# Patient Record
Sex: FEMALE | Race: WHITE | NOT HISPANIC OR LATINO | Employment: UNEMPLOYED | URBAN - METROPOLITAN AREA
[De-identification: names, ages, dates, MRNs, and addresses within clinical notes are randomized per-mention and may not be internally consistent; named-entity substitution may affect disease eponyms.]

---

## 2017-03-22 ENCOUNTER — OFFICE VISIT (OUTPATIENT)
Dept: FAMILY MEDICINE | Facility: CLINIC | Age: 22
End: 2017-03-22
Payer: COMMERCIAL

## 2017-03-22 VITALS
HEART RATE: 85 BPM | DIASTOLIC BLOOD PRESSURE: 84 MMHG | HEIGHT: 66 IN | WEIGHT: 136 LBS | SYSTOLIC BLOOD PRESSURE: 119 MMHG | BODY MASS INDEX: 21.86 KG/M2

## 2017-03-22 DIAGNOSIS — B96.89 BACTERIAL VAGINAL INFECTION: ICD-10-CM

## 2017-03-22 DIAGNOSIS — Z11.3 SCREEN FOR STD (SEXUALLY TRANSMITTED DISEASE): ICD-10-CM

## 2017-03-22 DIAGNOSIS — Z01.411 ABNORMAL FEMALE PELVIC EXAM: Primary | ICD-10-CM

## 2017-03-22 DIAGNOSIS — Z30.011 ENCOUNTER FOR INITIAL PRESCRIPTION OF CONTRACEPTIVE PILLS: ICD-10-CM

## 2017-03-22 DIAGNOSIS — F17.200 TOBACCO USE DISORDER: ICD-10-CM

## 2017-03-22 DIAGNOSIS — Z87.42 HISTORY OF ABNORMAL CERVICAL PAP SMEAR: ICD-10-CM

## 2017-03-22 DIAGNOSIS — N76.0 BACTERIAL VAGINAL INFECTION: ICD-10-CM

## 2017-03-22 DIAGNOSIS — N89.8 VAGINAL DISCHARGE: ICD-10-CM

## 2017-03-22 DIAGNOSIS — A60.00 GENITAL HERPES SIMPLEX, UNSPECIFIED SITE: ICD-10-CM

## 2017-03-22 LAB
BETA HCG QUAL IFA URINE: NEGATIVE
MICRO REPORT STATUS: ABNORMAL
SPECIMEN SOURCE: ABNORMAL
WET PREP SPEC: ABNORMAL

## 2017-03-22 PROCEDURE — 87210 SMEAR WET MOUNT SALINE/INK: CPT | Performed by: NURSE PRACTITIONER

## 2017-03-22 PROCEDURE — 87591 N.GONORRHOEAE DNA AMP PROB: CPT | Performed by: NURSE PRACTITIONER

## 2017-03-22 PROCEDURE — G0124 SCREEN C/V THIN LAYER BY MD: HCPCS | Performed by: NURSE PRACTITIONER

## 2017-03-22 PROCEDURE — 84703 CHORIONIC GONADOTROPIN ASSAY: CPT | Performed by: NURSE PRACTITIONER

## 2017-03-22 PROCEDURE — 96372 THER/PROPH/DIAG INJ SC/IM: CPT | Performed by: NURSE PRACTITIONER

## 2017-03-22 PROCEDURE — 87491 CHLMYD TRACH DNA AMP PROBE: CPT | Performed by: NURSE PRACTITIONER

## 2017-03-22 PROCEDURE — 99214 OFFICE O/P EST MOD 30 MIN: CPT | Mod: 25 | Performed by: NURSE PRACTITIONER

## 2017-03-22 PROCEDURE — 88175 CYTOPATH C/V AUTO FLUID REDO: CPT | Performed by: NURSE PRACTITIONER

## 2017-03-22 RX ORDER — METRONIDAZOLE 500 MG/1
500 TABLET ORAL 2 TIMES DAILY
Qty: 14 TABLET | Refills: 0 | Status: SHIPPED | OUTPATIENT
Start: 2017-03-22 | End: 2017-04-20

## 2017-03-22 RX ORDER — VALACYCLOVIR HYDROCHLORIDE 500 MG/1
500 TABLET, FILM COATED ORAL 2 TIMES DAILY
Qty: 6 TABLET | Refills: 3 | Status: SHIPPED | OUTPATIENT
Start: 2017-03-22 | End: 2017-03-22

## 2017-03-22 NOTE — NURSING NOTE
"Initial /84 (BP Location: Left arm, Patient Position: Chair, Cuff Size: Adult Regular)  Pulse 85  Ht 5' 6.25\" (1.683 m)  Wt 136 lb (61.7 kg)  BMI 21.79 kg/m2 Estimated body mass index is 21.79 kg/(m^2) as calculated from the following:    Height as of this encounter: 5' 6.25\" (1.683 m).    Weight as of this encounter: 136 lb (61.7 kg). .    Bina Sampson    "

## 2017-03-22 NOTE — PROGRESS NOTES
SUBJECTIVE:                                                    Daniela Byrne is a 21 year old female who presents to clinic today for the following health issues:      Medication Followup of Valtrex    Taking Medication as prescribed: has been out of meds. Currently having an outbreak which started 4-5 days ago. Has not had for 2 years    Side Effects:  None    Medication Helping Symptoms:  yes       Pap, patient is due for a repeat pap. HX of LSIL. S/P colposcopy 11/5/15- did not do follow up .     Has concerns about herpes outbreak- thinks she has an outbreak.     Problem list and histories reviewed & adjusted, as indicated.  Additional history: as documented    Patient Active Problem List   Diagnosis     Health Care Home     HSV-1 infection     Abnormal Pap smear of cervix     Genital herpes     CARDIOVASCULAR SCREENING; LDL GOAL LESS THAN 160     Mild persistent asthma, uncomplicated     Encounter for surveillance of injectable contraceptive     Past Surgical History:   Procedure Laterality Date     NO HISTORY OF SURGERY  8/2011       Social History   Substance Use Topics     Smoking status: Current Every Day Smoker     Packs/day: 0.50     Years: 8.00     Types: Cigarettes     Smokeless tobacco: Never Used     Alcohol use 4.2 oz/week     7 Standard drinks or equivalent per week      Comment: Completed STOP and DUAL alcohol tx programs.      Family History   Problem Relation Age of Onset     Alcohol/Drug Father      ETOH     Depression Father      CANCER Maternal Grandmother      larynx           Reviewed and updated as needed this visit by clinical staff       Reviewed and updated as needed this visit by Provider         ROS:  Constitutional, HEENT, cardiovascular, pulmonary, GI, , musculoskeletal, neuro, skin, endocrine and psych systems are negative, except as otherwise noted.    OBJECTIVE:                                                    /84 (BP Location: Left arm, Patient Position: Chair, Cuff  "Size: Adult Regular)  Pulse 85  Ht 5' 6.25\" (1.683 m)  Wt 136 lb (61.7 kg)  LMP 02/22/2017  BMI 21.79 kg/m2  Body mass index is 21.79 kg/(m^2).  GENERAL: healthy, alert and no distress  HENT: ear canals and TM's normal, nose and mouth without ulcers or lesions  NECK: no adenopathy, no asymmetry, masses, or scars and thyroid normal to palpation  RESP: lungs clear to auscultation - no rales, rhonchi or wheezes  CV: regular rate and rhythm, normal S1 S2, no S3 or S4, no murmur, click or rub, no peripheral edema and peripheral pulses strong   (female): normal female external genitalia, normal urethral meatus , vaginal mucosa pink, moist, well rugated, vaginal discharge - moderate and white and normal cervix, adnexae, and uterus without masses.  RECTAL (female): anal fissure   MS: no gross musculoskeletal defects noted, no edema    Diagnostic Test Results:  none      ASSESSMENT/PLAN:                                                      1. Abnormal female pelvic exam  ? Bacterial vs yeast infection      3. Contraception    - Beta HCG qual IFA urine  - Medroxyprogesterone inj  1mg   (Depo Provera J-Code)  - INJECTION INTRAMUSCULAR OR SUB-Q    4. Screen for STD (sexually transmitted disease)    - Chlamydia trachomatis PCR  - Neisseria gonorrhoeae PCR    6. Abnormal Pap smear of cervix  Patient had colposcopy in 2015 and failed to follow up with repeat PAP  - Pap imaged thin layer screen only - recommended age 21 - 24 years    7. Vaginal discharge  R/u yeast infection vs bacterial infection- pending results may need to send in prescription   - Wet prep    8. Tobacco use disorder  Encouraged patient to quit smoking- she is not interested at this time      Addendum: Wet prep positive for BV- will send in Flagyl 500 mg bid X 7 days-          NIA Alcaraz [facility]  "

## 2017-03-22 NOTE — PATIENT INSTRUCTIONS
Thank you for choosing [facility].  You may be receiving a survey in the mail from Hemalatha Bautista regarding your visit today.  Please take a few minutes to complete and return the survey to let us know how we are doing.      If you have questions or concerns, please contact us via [email or web address] or you can contact your care team at [phone].    Our Clinic hours are:  Monday 6:40 am  to 7:00 pm  Tuesday -Friday 6:40 am to 5:00 pm    The [place] outpatient lab hours are:  Monday - Friday 6:10 am to 4:45 pm  Saturdays 7:00 am to 11:00 am  Appointments are required, call [phone]    If you have clinical questions after hours or would like to schedule an appointment,  call the clinic at [phone].

## 2017-03-22 NOTE — MR AVS SNAPSHOT
After Visit Summary   3/22/2017    Daniela Byrne    MRN: [id number]           Patient Information     Date Of Birth          1995        Visit Information        Provider Department      3/22/2017 11:20 AM Bisi Muro APRN CNP [facility]        Today's Diagnoses     Contraception    -  1    Abnormal female pelvic exam        Screen for STD (sexually transmitted disease)        Abnormal Pap smear of cervix        Vaginal discharge        Tobacco use disorder        Bacterial vaginal infection          Care Instructions          Thank you for choosing [facility].  You may be receiving a survey in the mail from Hemalatha Bautista regarding your visit today.  Please take a few minutes to complete and return the survey to let us know how we are doing.      If you have questions or concerns, please contact us via [address] or you can contact your care team at [phone].    Our Clinic hours are:  Monday 6:40 am  to 7:00 pm  Tuesday -Friday 6:40 am to 5:00 pm    The [place] outpatient lab hours are:  Monday - Friday 6:10 am to 4:45 pm  Saturdays 7:00 am to 11:00 am  Appointments are required, call [phone]    If you have clinical questions after hours or would like to schedule an appointment,  call the clinic at [phone].        Follow-ups after your visit        Who to contact     If you have questions or need follow up information about today's clinic visit or your schedule please contact [facility] directly at [phone].  Normal or non-critical lab and imaging results will be communicated to you by MyChart, letter or phone within 4 business days after the clinic has received the results. If you do not hear from us within 7 days, please contact the clinic through [employer] or phone. If you have a critical or abnormal lab result, we will notify you by phone as soon as possible.  Submit refill requests through [address] or call your pharmacy and they will  "forward the refill request to us. Please allow 3 business days for your refill to be completed.          Additional Information About Your Visit        [employer] Information     [employer] gives you secure access to your electronic health record. If you see a primary care provider, you can also send messages to your care team and make appointments. If you have questions, please call your primary care clinic.  If you do not have a primary care provider, please call [phone] and they will assist you.        Care EveryWhere ID     This is your Care EveryWhere ID. This could be used by other organizations to access your [place] medical records  [id number]        Your Vitals Were     Pulse Height Last Period BMI (Body Mass Index)          85 5' 6.25\" (1.683 m) 02/22/2017 21.79 kg/m2         Blood Pressure from Last 3 Encounters:   03/22/17 119/84   01/08/16 111/76   01/05/16 109/86    Weight from Last 3 Encounters:   03/22/17 136 lb (61.7 kg)   01/08/16 136 lb (61.7 kg)   01/05/16 136 lb 3.2 oz (61.8 kg)              We Performed the Following     Beta HCG qual IFA urine     Chlamydia trachomatis PCR     INJECTION INTRAMUSCULAR OR SUB-Q     Medroxyprogesterone inj  1mg   (Depo Provera J-Code)     Neisseria gonorrhoeae PCR     Pap imaged thin layer screen only - recommended age 21 - 24 years     Wet prep          Today's Medication Changes          These changes are accurate as of: 3/22/17 12:36 PM.  If you have any questions, ask your nurse or doctor.               Start taking these medicines.        Dose/Directions    metroNIDAZOLE 500 MG tablet   Commonly known as:  FLAGYL   Used for:  Bacterial vaginal infection   Started by:  Bisi Muro APRN CNP        Dose:  500 mg   Take 1 tablet (500 mg) by mouth 2 times daily   Quantity:  14 tablet   Refills:  0         Stop taking these medicines if you haven't already. Please contact your care team if you have questions.     valACYclovir 500 MG tablet   Commonly " known as:  VALTREX   Stopped by:  Bisi Muro APRN CNP                Where to get your medicines      These medications were sent to [facility] [place] - [address]  [address] [facility] [address] [facility] [address]     Phone:  [phone]     metroNIDAZOLE 500 MG tablet                Primary Care Provider Office Phone # Fax #    Larisa EspinalJORDAN [phone]       [facility] [address] [address]        Thank you!     Thank you for choosing [facility]  for your care. Our goal is always to provide you with excellent care. Hearing back from our patients is one way we can continue to improve our services. Please take a few minutes to complete the written survey that you may receive in the mail after your visit with us. Thank you!             Your Updated Medication List - Protect others around you: Learn how to safely use, store and throw away your medicines at www.disposemymeds.org.          This list is accurate as of: 3/22/17 12:36 PM.  Always use your most recent med list.                   Brand Name Dispense Instructions for use    * albuterol (2.5 MG/3ML) 0.083% neb solution     60 mL    Take 1 vial (2.5 mg) by nebulization every 4 hours as needed for shortness of breath / dyspnea       * albuterol 108 (90 BASE) MCG/ACT Inhaler    PROAIR HFA/PROVENTIL HFA/VENTOLIN HFA    1 Inhaler    Inhale 2 puffs into the lungs every 4 hours as needed for shortness of breath / dyspnea       budesonide 180 MCG/ACT inhaler    PULMICORT FLEXHALER    1 Inhaler    Inhale 1 puff into the lungs 2 times daily       DEPO-PROVERA 150 MG/ML injection   Generic drug:  medroxyPROGESTERone      Inject 150 mg into the muscle every 3 months Reported on 3/22/2017       metroNIDAZOLE 500 MG tablet    FLAGYL    14 tablet    Take 1 tablet (500 mg) by mouth 2 times daily       * Notice:  This list has 2 medication(s) that are the same as other medications  prescribed for you. Read the directions carefully, and ask your doctor or other care provider to review them with you.

## 2017-03-23 LAB
C TRACH DNA SPEC QL NAA+PROBE: NORMAL
N GONORRHOEA DNA SPEC QL NAA+PROBE: NORMAL
SPECIMEN SOURCE: NORMAL
SPECIMEN SOURCE: NORMAL

## 2017-03-23 ASSESSMENT — ASTHMA QUESTIONNAIRES: ACT_TOTALSCORE: 21

## 2017-03-29 LAB
COPATH REPORT: ABNORMAL
PAP: ABNORMAL

## 2017-04-20 ENCOUNTER — OFFICE VISIT (OUTPATIENT)
Dept: OBGYN | Facility: CLINIC | Age: 22
End: 2017-04-20
Payer: COMMERCIAL

## 2017-04-20 VITALS
HEIGHT: 65 IN | WEIGHT: 136 LBS | HEART RATE: 60 BPM | BODY MASS INDEX: 22.66 KG/M2 | SYSTOLIC BLOOD PRESSURE: 104 MMHG | DIASTOLIC BLOOD PRESSURE: 67 MMHG

## 2017-04-20 DIAGNOSIS — Z01.812 PRE-PROCEDURE LAB EXAM: ICD-10-CM

## 2017-04-20 DIAGNOSIS — R87.611 ATYPICAL SQUAMOUS CELLS CANNOT EXCLUDE HIGH GRADE SQUAMOUS INTRAEPITHELIAL LESION ON CYTOLOGIC SMEAR OF CERVIX (ASC-H): Primary | ICD-10-CM

## 2017-04-20 LAB — BETA HCG QUAL IFA URINE: NEGATIVE

## 2017-04-20 PROCEDURE — 88342 IMHCHEM/IMCYTCHM 1ST ANTB: CPT | Performed by: OBSTETRICS & GYNECOLOGY

## 2017-04-20 PROCEDURE — 57456 ENDOCERV CURETTAGE W/SCOPE: CPT | Performed by: OBSTETRICS & GYNECOLOGY

## 2017-04-20 PROCEDURE — 88305 TISSUE EXAM BY PATHOLOGIST: CPT | Performed by: OBSTETRICS & GYNECOLOGY

## 2017-04-20 PROCEDURE — 84703 CHORIONIC GONADOTROPIN ASSAY: CPT | Performed by: OBSTETRICS & GYNECOLOGY

## 2017-04-20 PROCEDURE — 88341 IMHCHEM/IMCYTCHM EA ADD ANTB: CPT | Performed by: OBSTETRICS & GYNECOLOGY

## 2017-04-20 NOTE — MR AVS SNAPSHOT
"              After Visit Summary   4/20/2017    Daniela Byrne    MRN: [id number]           Patient Information     Date Of Birth          1995        Visit Information        Provider Department      4/20/2017 10:30 AM Tracie Steel MD; [facility]        Today's Diagnoses     Atypical squamous cells cannot exclude high grade squamous intraepithelial lesion on cytologic smear of cervix (ASC-H)    -  1    Pre-procedure lab exam           Follow-ups after your visit        Who to contact     If you have questions or need follow up information about today's clinic visit or your schedule please contact [facility] directly at [phone].  Normal or non-critical lab and imaging results will be communicated to you by MyChart, letter or phone within 4 business days after the clinic has received the results. If you do not hear from us within 7 days, please contact the clinic through [email or web address] or phone. If you have a critical or abnormal lab result, we will notify you by phone as soon as possible.  Submit refill requests through [employer] or call your pharmacy and they will forward the refill request to us. Please allow 3 business days for your refill to be completed.          Additional Information About Your Visit        MyChart Information     [employer] gives you secure access to your electronic health record. If you see a primary care provider, you can also send messages to your care team and make appointments. If you have questions, please call your primary care clinic.  If you do not have a primary care provider, please call [phone] and they will assist you.        Care EveryWhere ID     This is your Care EveryWhere ID. This could be used by other organizations to access your [place] medical records  [id number]        Your Vitals Were     Pulse Height Last Period Breastfeeding? BMI (Body Mass Index)       60 5' 5\" (1.651 m) 02/22/2017 No 22.63 kg/m2  "       Blood Pressure from Last 3 Encounters:   04/20/17 104/67   03/22/17 119/84   01/08/16 111/76    Weight from Last 3 Encounters:   04/20/17 136 lb (61.7 kg)   03/22/17 136 lb (61.7 kg)   01/08/16 136 lb (61.7 kg)              We Performed the Following     Beta HCG qual IFA urine     COLP CERVIX/UPPER VAGINA W ENDOCERV CURETT     Surgical pathology exam        Primary Care Provider Office Phone # Fax #    Larisa Malissa Espinal [phone]       [facility] [address] [address]        Thank you!     Thank you for choosing [facility]  for your care. Our goal is always to provide you with excellent care. Hearing back from our patients is one way we can continue to improve our services. Please take a few minutes to complete the written survey that you may receive in the mail after your visit with us. Thank you!             Your Updated Medication List - Protect others around you: Learn how to safely use, store and throw away your medicines at www.disposemymeds.org.          This list is accurate as of: 4/20/17 11:54 AM.  Always use your most recent med list.                   Brand Name Dispense Instructions for use    * albuterol (2.5 MG/3ML) 0.083% neb solution     60 mL    Take 1 vial (2.5 mg) by nebulization every 4 hours as needed for shortness of breath / dyspnea       * albuterol 108 (90 BASE) MCG/ACT Inhaler    PROAIR HFA/PROVENTIL HFA/VENTOLIN HFA    1 Inhaler    Inhale 2 puffs into the lungs every 4 hours as needed for shortness of breath / dyspnea       budesonide 180 MCG/ACT inhaler    PULMICORT FLEXHALER    1 Inhaler    Inhale 1 puff into the lungs 2 times daily       DEPO-PROVERA 150 MG/ML injection   Generic drug:  medroxyPROGESTERone      Inject 150 mg into the muscle every 3 months Reported on 3/22/2017       * Notice:  This list has 2 medication(s) that are the same as other medications prescribed for you. Read the directions carefully, and  ask your doctor or other care provider to review them with you.

## 2017-04-20 NOTE — PROGRESS NOTES
Daniela Byrne is a 21 year old female G0 who presents for repeat colposcopy. Referred by NIA Alcaraz CNP    Pap smear hx is as follows:   9/28/12: Pap - ASCUS. Repeat pap in 1 year. (< 21 yrs of age).  7/31/14: Pap - ASCUS, + HR HPV 16. Repeat pap in 1 year. (19 yrs old).   10/6/15 pap LSIL/+ HR HPV 16 and other HR HPV. Plan: colp  11/5/15: [place] Bx - MAHAMED 2 & 3, ECC - HSIL. Plan pap and colp in 6 months.   Pt failed follow-up.   3/22/17: ASC-H Pap. Plan colp    Patient's last menstrual period was 02/22/2017.   UPT today is negative      Patient does smoke    Type of contraception: Depo-Provera  Age at first sexual intercourse: 12-13  Number of sexual partners (lifetime): 30-40  Past GYN history:  No STD history  Prior cervical/vaginal disease: MAHAMED 2-3.  Prior cervical treatment: no treatment.      PROCEDURE:  Before the procedure, it was ensured that the patient was educated regarding the nature of her findings to date, the implications, and what was to be done. She has been made aware of the role of HPV, the natural history of infection, ways to minimize her future risk, the effect of HPV on the cervix, and treatment options available should they be indicated.  The details of the colposcopic procedure were reviewed.  All questions were answered before proceeding, and informed consent was therefore obtained.    Speculum placed in vagina and excellent visualization of cervix   acheived, cervix swabbed x 3 with acetic acid solution.    FINDINGS:  Cervix: no visible lesions  Please refer to images section for details.    Pap repeated?:  No  SCJ seen?:  yes    ECC done?:  Yes  Lugol's solution used?:  Yes, with no lesions seen  Satisfactory examination?:  yes      ASSESSMENT: Normal exam without visible pathology.    PLAN: specimens labelled and sent to Pathology, will base further treatment on Pathology findings, post biopsy instructions given to patient, call to discuss Pathology results and  Smoking  cessation discussed    Tracie Steel MD  [facility]

## 2017-04-20 NOTE — NURSING NOTE
"Initial /67 (BP Location: Left arm, Patient Position: Chair, Cuff Size: Adult Regular)  Pulse 60  Ht 5' 5\" (1.651 m)  Wt 136 lb (61.7 kg)  LMP 02/22/2017  Breastfeeding? No  BMI 22.63 kg/m2 Estimated body mass index is 22.63 kg/(m^2) as calculated from the following:    Height as of this encounter: 5' 5\" (1.651 m).    Weight as of this encounter: 136 lb (61.7 kg). .    Rachel Luther    "

## 2017-04-23 LAB — COPATH REPORT: NORMAL

## 2017-04-24 NOTE — PROGRESS NOTES
Given that MAHAMED 3 has persisted for more than a year as she has had this in 2015, I would recommend that she undergo a LEEP instead of colposcopy and cytology in 6 months.     Tracie Steel MD  [facility]

## 2017-09-17 ENCOUNTER — APPOINTMENT (OUTPATIENT)
Dept: GENERAL RADIOLOGY | Facility: CLINIC | Age: 22
DRG: 101 | End: 2017-09-17
Attending: FAMILY MEDICINE
Payer: COMMERCIAL

## 2017-09-17 ENCOUNTER — APPOINTMENT (OUTPATIENT)
Dept: CT IMAGING | Facility: CLINIC | Age: 22
DRG: 101 | End: 2017-09-17
Attending: FAMILY MEDICINE
Payer: COMMERCIAL

## 2017-09-17 ENCOUNTER — HOSPITAL ENCOUNTER (INPATIENT)
Facility: CLINIC | Age: 22
LOS: 6 days | Discharge: HOME | DRG: 101 | End: 2017-09-24
Attending: FAMILY MEDICINE | Admitting: ORTHOPAEDIC SURGERY
Payer: COMMERCIAL

## 2017-09-17 DIAGNOSIS — R56.9 SEIZURES (H): ICD-10-CM

## 2017-09-17 DIAGNOSIS — F15.10 METHAMPHETAMINE ABUSE (H): ICD-10-CM

## 2017-09-17 DIAGNOSIS — T50.902A PURPOSEFUL NON-SUICIDAL DRUG INGESTION, INITIAL ENCOUNTER (H): ICD-10-CM

## 2017-09-17 DIAGNOSIS — F16.90: ICD-10-CM

## 2017-09-17 LAB
ALBUMIN SERPL-MCNC: 4.6 G/DL (ref 3.4–5)
ALBUMIN UR-MCNC: 100 MG/DL
ALP SERPL-CCNC: 82 U/L (ref 40–150)
ALT SERPL W P-5'-P-CCNC: 46 U/L (ref 0–50)
AMPHETAMINES UR QL SCN: POSITIVE
ANION GAP SERPL CALCULATED.3IONS-SCNC: 15 MMOL/L (ref 3–14)
APAP SERPL-MCNC: <2 MG/L (ref 10–20)
APPEARANCE UR: CLEAR
AST SERPL W P-5'-P-CCNC: 44 U/L (ref 0–45)
BARBITURATES UR QL: NEGATIVE
BASOPHILS # BLD AUTO: 0 10E9/L (ref 0–0.2)
BASOPHILS NFR BLD AUTO: 0.1 %
BENZODIAZ UR QL: NEGATIVE
BILIRUB SERPL-MCNC: 0.6 MG/DL (ref 0.2–1.3)
BILIRUB UR QL STRIP: NEGATIVE
BUN SERPL-MCNC: 5 MG/DL (ref 7–30)
CALCIUM SERPL-MCNC: 9 MG/DL (ref 8.5–10.1)
CANNABINOIDS UR QL SCN: NEGATIVE
CHLORIDE SERPL-SCNC: 105 MMOL/L (ref 94–109)
CO2 SERPL-SCNC: 18 MMOL/L (ref 20–32)
COCAINE UR QL: NEGATIVE
COLOR UR AUTO: YELLOW
CREAT SERPL-MCNC: 0.6 MG/DL (ref 0.52–1.04)
DIFFERENTIAL METHOD BLD: ABNORMAL
EOSINOPHIL # BLD AUTO: 0 10E9/L (ref 0–0.7)
EOSINOPHIL NFR BLD AUTO: 0 %
ERYTHROCYTE [DISTWIDTH] IN BLOOD BY AUTOMATED COUNT: 13.8 % (ref 10–15)
ETHANOL SERPL-MCNC: 0.02 G/DL
ETHANOL UR QL SCN: POSITIVE
GFR SERPL CREATININE-BSD FRML MDRD: >90 ML/MIN/1.7M2
GLUCOSE SERPL-MCNC: 107 MG/DL (ref 70–99)
GLUCOSE UR STRIP-MCNC: NEGATIVE MG/DL
HCG UR QL: NEGATIVE
HCT VFR BLD AUTO: 37.4 % (ref 35–47)
HGB BLD-MCNC: 13.5 G/DL (ref 11.7–15.7)
HGB UR QL STRIP: ABNORMAL
IMM GRANULOCYTES # BLD: 0 10E9/L (ref 0–0.4)
IMM GRANULOCYTES NFR BLD: 0.3 %
INTERNAL QC OK POCT: YES
KETONES UR STRIP-MCNC: 10 MG/DL
LACTATE BLD-SCNC: 4.5 MMOL/L (ref 0.7–2)
LEUKOCYTE ESTERASE UR QL STRIP: NEGATIVE
LIPASE SERPL-CCNC: 92 U/L (ref 73–393)
LYMPHOCYTES # BLD AUTO: 0.6 10E9/L (ref 0.8–5.3)
LYMPHOCYTES NFR BLD AUTO: 5.2 %
MCH RBC QN AUTO: 35.3 PG (ref 26.5–33)
MCHC RBC AUTO-ENTMCNC: 36.1 G/DL (ref 31.5–36.5)
MCV RBC AUTO: 98 FL (ref 78–100)
MONOCYTES # BLD AUTO: 0.5 10E9/L (ref 0–1.3)
MONOCYTES NFR BLD AUTO: 4.1 %
MUCOUS THREADS #/AREA URNS LPF: PRESENT /LPF
NEUTROPHILS # BLD AUTO: 10.9 10E9/L (ref 1.6–8.3)
NEUTROPHILS NFR BLD AUTO: 90.3 %
NITRATE UR QL: NEGATIVE
NRBC # BLD AUTO: 0 10*3/UL
NRBC BLD AUTO-RTO: 0 /100
OPIATES UR QL SCN: NEGATIVE
PH UR STRIP: 6 PH (ref 5–7)
PLATELET # BLD AUTO: 171 10E9/L (ref 150–450)
POTASSIUM SERPL-SCNC: 3.9 MMOL/L (ref 3.4–5.3)
PROT SERPL-MCNC: 9 G/DL (ref 6.8–8.8)
RBC # BLD AUTO: 3.82 10E12/L (ref 3.8–5.2)
RBC #/AREA URNS AUTO: 2 /HPF (ref 0–2)
SALICYLATES SERPL-MCNC: 4 MG/DL
SODIUM SERPL-SCNC: 138 MMOL/L (ref 133–144)
SOURCE: ABNORMAL
SP GR UR STRIP: 1.02 (ref 1–1.03)
SQUAMOUS #/AREA URNS AUTO: <1 /HPF (ref 0–1)
TSH SERPL DL<=0.005 MIU/L-ACNC: 0.53 MU/L (ref 0.4–4)
UROBILINOGEN UR STRIP-MCNC: NORMAL MG/DL (ref 0–2)
WBC # BLD AUTO: 12 10E9/L (ref 4–11)
WBC #/AREA URNS AUTO: 1 /HPF (ref 0–2)

## 2017-09-17 PROCEDURE — 80307 DRUG TEST PRSMV CHEM ANLYZR: CPT | Performed by: FAMILY MEDICINE

## 2017-09-17 PROCEDURE — 81001 URINALYSIS AUTO W/SCOPE: CPT | Performed by: FAMILY MEDICINE

## 2017-09-17 PROCEDURE — 99285 EMERGENCY DEPT VISIT HI MDM: CPT | Mod: 25 | Performed by: FAMILY MEDICINE

## 2017-09-17 PROCEDURE — 80320 DRUG SCREEN QUANTALCOHOLS: CPT | Performed by: FAMILY MEDICINE

## 2017-09-17 PROCEDURE — 93010 ELECTROCARDIOGRAM REPORT: CPT | Mod: Z6 | Performed by: FAMILY MEDICINE

## 2017-09-17 PROCEDURE — 83605 ASSAY OF LACTIC ACID: CPT | Performed by: FAMILY MEDICINE

## 2017-09-17 PROCEDURE — HZ2ZZZZ DETOXIFICATION SERVICES FOR SUBSTANCE ABUSE TREATMENT: ICD-10-PCS | Performed by: FAMILY MEDICINE

## 2017-09-17 PROCEDURE — 80329 ANALGESICS NON-OPIOID 1 OR 2: CPT | Performed by: FAMILY MEDICINE

## 2017-09-17 PROCEDURE — 83690 ASSAY OF LIPASE: CPT | Performed by: FAMILY MEDICINE

## 2017-09-17 PROCEDURE — 71020 XR CHEST 2 VW: CPT

## 2017-09-17 PROCEDURE — 93005 ELECTROCARDIOGRAM TRACING: CPT | Performed by: FAMILY MEDICINE

## 2017-09-17 PROCEDURE — 81025 URINE PREGNANCY TEST: CPT | Performed by: FAMILY MEDICINE

## 2017-09-17 PROCEDURE — 80053 COMPREHEN METABOLIC PANEL: CPT | Performed by: FAMILY MEDICINE

## 2017-09-17 PROCEDURE — 96374 THER/PROPH/DIAG INJ IV PUSH: CPT | Performed by: FAMILY MEDICINE

## 2017-09-17 PROCEDURE — 85025 COMPLETE CBC W/AUTO DIFF WBC: CPT | Performed by: FAMILY MEDICINE

## 2017-09-17 PROCEDURE — 70450 CT HEAD/BRAIN W/O DYE: CPT

## 2017-09-17 PROCEDURE — 96361 HYDRATE IV INFUSION ADD-ON: CPT | Performed by: FAMILY MEDICINE

## 2017-09-17 PROCEDURE — 25000128 H RX IP 250 OP 636: Performed by: FAMILY MEDICINE

## 2017-09-17 PROCEDURE — 82550 ASSAY OF CK (CPK): CPT | Performed by: FAMILY MEDICINE

## 2017-09-17 PROCEDURE — 84443 ASSAY THYROID STIM HORMONE: CPT | Performed by: FAMILY MEDICINE

## 2017-09-17 RX ORDER — LORAZEPAM 2 MG/ML
0.5 INJECTION INTRAMUSCULAR ONCE
Status: COMPLETED | OUTPATIENT
Start: 2017-09-17 | End: 2017-09-17

## 2017-09-17 RX ADMIN — LORAZEPAM 0.5 MG: 2 INJECTION INTRAMUSCULAR; INTRAVENOUS at 21:54

## 2017-09-17 RX ADMIN — SODIUM CHLORIDE 1000 ML: 9 INJECTION, SOLUTION INTRAVENOUS at 21:52

## 2017-09-17 RX ADMIN — SODIUM CHLORIDE 1000 ML: 9 INJECTION, SOLUTION INTRAVENOUS at 23:43

## 2017-09-17 NOTE — IP AVS SNAPSHOT
[address] [address]    Phone:  [phone]                                       After Visit Summary   9/17/2017    Daniela Byrne    MRN: [id number]           After Visit Summary Signature Page     I have received my discharge instructions, and my questions have been answered. I have discussed any challenges I see with this plan with the nurse or doctor.    ..........................................................................................................................................  Patient/Patient Representative Signature      ..........................................................................................................................................  Patient Representative Print Name and Relationship to Patient    ..................................................               ................................................  Date                                            Time    ..........................................................................................................................................  Reviewed by Signature/Title    ...................................................              ..............................................  Date                                                            Time

## 2017-09-17 NOTE — IP AVS SNAPSHOT
MRN:[id number]                      After Visit Summary   9/17/2017    Daniela Byrne    MRN: [id number]           Thank you!     Thank you for choosing [facility] for your care. Our goal is always to provide you with excellent care. Hearing back from our patients is one way we can continue to improve our services. Please take a few minutes to complete the written survey that you may receive in the mail after you visit with us. Thank you!        Patient Information     Date Of Birth          1995        Designated Caregiver       Most Recent Value    Caregiver    Will someone help with your care after discharge? yes    Name of designated caregiver Kirstin Byrne    Phone number of caregiver [phone]    Caregiver address        About your hospital stay     You were admitted on:  September 18, 2017 You last received care in the:  [address] [facility]    You were discharged on:  September 24, 2017        Reason for your hospital stay       You were admitted for a seizure related to drug intake/alcohol withdrawal.  You were placed on alcohol withdrawal protocol, had no further seizures, and were discharged.  You did not need to see neurology or start anti-seizure medications.            Reason for your hospital stay       You were admitted after a seizure and treated for muscle breakdown (rhabdomyolysis) with lots of IV fluids. Your kidney numbers remained normal.                  Who to Call     For medical emergencies, please call 911.  For non-urgent questions about your medical care, please call your primary care provider or clinic, [phone]          Attending Provider     Provider Specialty    Brain Lopez MD Emergency Medicine    Jeff Molina MD Internal Medicine    Tal, MD Shr[facility] Internal Medicine       Primary Care Provider Office Phone # Fax #    Larisa Espinal [phone]      After Care Instructions     Activity       Your activity upon  discharge: activity as tolerated            Activity       Your activity upon discharge: activity as tolerated            Diet       Follow this diet upon discharge: Orders Placed This Encounter      Regular Diet Adult            Diet       Follow this diet upon discharge: Orders Placed This Encounter      Room Service      Regular Diet Adult      Diet            Discharge Instructions       Please get over the counter vitamins: Vitamin B1 (thiamine) and folic acid. Alternatively can take a multivitamin with these in it.                  Follow-up Appointments     Adult [facility] Follow-up and recommended labs and tests       Follow up with primary care provider, Larisa Espinal, within 7 days for hospital follow- up.  The following labs/tests are recommended: CK.      Appointments on [place] and/or [facility] (with [facility] or [facility] provider or service). Call [phone] if you haven't heard regarding these appointments within 7 days of discharge.                  Your next 10 appointments already scheduled     Oct 10, 2017  2:30 PM CDT   Office Visit with Gissell Le MD, [facility] 2   [facility] ([facility])    [address] [address] [phone]           Bring a current list of meds and any records pertaining to this visit. For Physicals, please bring immunization records and any forms needing to be filled out. Please arrive 10 minutes early to complete paperwork.              Pending Results     No orders found from 9/15/2017 to 9/18/2017.            Statement of Approval     Ordered          09/24/17 1046  I have reviewed and agree with all the recommendations and orders detailed in this document.  EFFECTIVE NOW     Approved and electronically signed by:  Brenda Geiger MD             Admission Information     Date & Time Provider Department Dept. Phone    9/17/2017 Shruthi Parada MD [address] [facility] [place] [phone]      Your Vitals  Were     Blood Pressure Pulse Temperature Respirations Weight Pulse Oximetry    124/84 (BP Location: Left arm) 71 97  F (36.1  C) (Oral) 16 61 kg (134 lb 6.4 oz) 100%    BMI (Body Mass Index)                   22.37 kg/m2           [facility] Information     [facility] gives you secure access to your electronic health record. If you see a primary care provider, you can also send messages to your care team and make appointments. If you have questions, please call your primary care clinic.  If you do not have a primary care provider, please call [phone] and they will assist you.        Care EveryWhere ID     This is your Care EveryWhere ID. This could be used by other organizations to access your [facility] medical records  [id number]        Equal Access to Services     RAÚL FUENTES : Anne Nelson, frederick burdick, samuel pappas, morgan miguel. So [facility] [phone].    ATENCIÓN: Si habla español, tiene a cook disposición servicios gratuitos de asistencia lingüística. Llame al [phone].    We comply with applicable federal civil rights laws and [place] laws. We do not discriminate on the basis of race, color, national origin, age, disability sex, sexual orientation or gender identity.               Review of your medicines      CONTINUE these medicines which have NOT CHANGED        Dose / Directions    albuterol 108 (90 BASE) MCG/ACT Inhaler   Commonly known as:  PROAIR HFA/PROVENTIL HFA/VENTOLIN HFA   Used for:  Mild persistent asthma, uncomplicated        Dose:  2 puff   Inhale 2 puffs into the lungs every 4 hours as needed for shortness of breath / dyspnea   Quantity:  1 Inhaler   Refills:  5       DEPO-PROVERA 150 MG/ML injection   Used for:  Low grade squamous intraepithelial lesion on cytologic smear of cervix (LGSIL)   Generic drug:  medroxyPROGESTERone        Dose:  150 mg   Inject 150 mg into the muscle every 3 months Reported on 3/22/2017   Refills:   0                Protect others around you: Learn how to safely use, store and throw away your medicines at www.disposemymeds.org.             Medication List: This is a list of all your medications and when to take them. Check marks below indicate your daily home schedule. Keep this list as a reference.      Medications           Morning Afternoon Evening Bedtime As Needed    albuterol 108 (90 BASE) MCG/ACT Inhaler   Commonly known as:  PROAIR HFA/PROVENTIL HFA/VENTOLIN HFA   Inhale 2 puffs into the lungs every 4 hours as needed for shortness of breath / dyspnea   Last time this was given:  2 puffs on 9/19/2017  8:50 PM                                DEPO-PROVERA 150 MG/ML injection   Inject 150 mg into the muscle every 3 months Reported on 3/22/2017   Generic drug:  medroxyPROGESTERone

## 2017-09-18 PROBLEM — R56.9 SEIZURE (H): Status: ACTIVE | Noted: 2017-09-18

## 2017-09-18 LAB
ACETAMINOPHEN QUAL: NEGATIVE
ALBUMIN SERPL-MCNC: 4 G/DL (ref 3.4–5)
ALP SERPL-CCNC: 66 U/L (ref 40–150)
ALT SERPL W P-5'-P-CCNC: 42 U/L (ref 0–50)
AMOBARBITAL QUAL: NEGATIVE
ANION GAP SERPL CALCULATED.3IONS-SCNC: 14 MMOL/L (ref 3–14)
AST SERPL W P-5'-P-CCNC: 87 U/L (ref 0–45)
BARBITAL QUAL: NEGATIVE
BASOPHILS # BLD AUTO: 0 10E9/L (ref 0–0.2)
BASOPHILS NFR BLD AUTO: 0.2 %
BILIRUB SERPL-MCNC: 1.2 MG/DL (ref 0.2–1.3)
BUN SERPL-MCNC: 5 MG/DL (ref 7–30)
BUTABARBITAL QUAL: NEGATIVE
BUTALBITAL QUAL: NEGATIVE
CAFFEINE QUAL: POSITIVE
CALCIUM SERPL-MCNC: 8.8 MG/DL (ref 8.5–10.1)
CARBAMAZEPINE QUAL: NEGATIVE
CARISOPRODOL QUAL: NEGATIVE
CHLORIDE SERPL-SCNC: 106 MMOL/L (ref 94–109)
CHLORPROPAMIDE UR-MCNC: NEGATIVE UG/ML
CK SERPL-CCNC: 6768 U/L (ref 30–225)
CK SERPL-CCNC: 918 U/L (ref 30–225)
CO2 SERPL-SCNC: 18 MMOL/L (ref 20–32)
CREAT SERPL-MCNC: 0.54 MG/DL (ref 0.52–1.04)
DIFFERENTIAL METHOD BLD: ABNORMAL
DRUGS SERPL SCN: NEGATIVE
EOSINOPHIL # BLD AUTO: 0 10E9/L (ref 0–0.7)
EOSINOPHIL NFR BLD AUTO: 0 %
ERYTHROCYTE [DISTWIDTH] IN BLOOD BY AUTOMATED COUNT: 14 % (ref 10–15)
ETHCLORVYNOL QUAL: NEGATIVE
ETHINAMATE QUAL: NEGATIVE
ETHOSUXIMIDE QUAL: NEGATIVE
ETHOTOIN QUAL: NEGATIVE
GFR SERPL CREATININE-BSD FRML MDRD: >90 ML/MIN/1.7M2
GLUCOSE SERPL-MCNC: 66 MG/DL (ref 70–99)
GLUTETHIMIDE QUAL: NEGATIVE
HCT VFR BLD AUTO: 33.9 % (ref 35–47)
HGB BLD-MCNC: 12 G/DL (ref 11.7–15.7)
IBUPROFEN QUAL: NEGATIVE
IMM GRANULOCYTES # BLD: 0 10E9/L (ref 0–0.4)
IMM GRANULOCYTES NFR BLD: 0.2 %
LACTATE BLD-SCNC: 0.9 MMOL/L (ref 0.7–2)
LYMPHOCYTES # BLD AUTO: 2.8 10E9/L (ref 0.8–5.3)
LYMPHOCYTES NFR BLD AUTO: 32.5 %
MAGNESIUM SERPL-MCNC: 2.3 MG/DL (ref 1.6–2.3)
MCH RBC QN AUTO: 34.8 PG (ref 26.5–33)
MCHC RBC AUTO-ENTMCNC: 35.4 G/DL (ref 31.5–36.5)
MCV RBC AUTO: 98 FL (ref 78–100)
MEPHENYTOIN QUAL: NEGATIVE
MEPHOBARBITAL QUAL: NEGATIVE
MEPROBAMATE QUAL: NEGATIVE
METHAQUALONE QUAL: NEGATIVE
METHARBITAL QUAL: NEGATIVE
METHSUXIMIDE QUAL: NEGATIVE
METHYPRYLON QUAL: NEGATIVE
MONOCYTES # BLD AUTO: 0.9 10E9/L (ref 0–1.3)
MONOCYTES NFR BLD AUTO: 9.8 %
NEUTROPHILS # BLD AUTO: 5 10E9/L (ref 1.6–8.3)
NEUTROPHILS NFR BLD AUTO: 57.3 %
NRBC # BLD AUTO: 0 10*3/UL
NRBC BLD AUTO-RTO: 0 /100
PENTOBARBITAL QUAL: NEGATIVE
PHENACETIN QUAL: NEGATIVE
PHENOBARBITAL QUAL: NEGATIVE
PHENSUXIMIDE QUAL: NEGATIVE
PHENYTOIN QUAL: NEGATIVE
PHOSPHATE SERPL-MCNC: 2.5 MG/DL (ref 2.5–4.5)
PLATELET # BLD AUTO: 129 10E9/L (ref 150–450)
POTASSIUM SERPL-SCNC: 3.9 MMOL/L (ref 3.4–5.3)
PRIMIDONE QUAL: NEGATIVE
PROT SERPL-MCNC: 7.8 G/DL (ref 6.8–8.8)
RBC # BLD AUTO: 3.45 10E12/L (ref 3.8–5.2)
SALICYLATE QUAL: NEGATIVE
SECOBARBITAL QUAL: NEGATIVE
SODIUM SERPL-SCNC: 138 MMOL/L (ref 133–144)
TALBUTAL QUAL: NEGATIVE
THEOPHYLLINE QUAL: NEGATIVE
THIOPENTAL QUAL: NEGATIVE
TYBAMATE QUAL: NEGATIVE
VALPROIC ACID QUAL: NEGATIVE
WBC # BLD AUTO: 8.7 10E9/L (ref 4–11)

## 2017-09-18 PROCEDURE — 84100 ASSAY OF PHOSPHORUS: CPT | Performed by: STUDENT IN AN ORGANIZED HEALTH CARE EDUCATION/TRAINING PROGRAM

## 2017-09-18 PROCEDURE — 85025 COMPLETE CBC W/AUTO DIFF WBC: CPT | Performed by: STUDENT IN AN ORGANIZED HEALTH CARE EDUCATION/TRAINING PROGRAM

## 2017-09-18 PROCEDURE — 82550 ASSAY OF CK (CPK): CPT | Performed by: STUDENT IN AN ORGANIZED HEALTH CARE EDUCATION/TRAINING PROGRAM

## 2017-09-18 PROCEDURE — 25000128 H RX IP 250 OP 636: Performed by: STUDENT IN AN ORGANIZED HEALTH CARE EDUCATION/TRAINING PROGRAM

## 2017-09-18 PROCEDURE — 25000128 H RX IP 250 OP 636: Performed by: INTERNAL MEDICINE

## 2017-09-18 PROCEDURE — 99223 1ST HOSP IP/OBS HIGH 75: CPT | Mod: AI | Performed by: ORTHOPAEDIC SURGERY

## 2017-09-18 PROCEDURE — 12000001 ZZH R&B MED SURG/OB UMMC

## 2017-09-18 PROCEDURE — 36416 COLLJ CAPILLARY BLOOD SPEC: CPT | Performed by: STUDENT IN AN ORGANIZED HEALTH CARE EDUCATION/TRAINING PROGRAM

## 2017-09-18 PROCEDURE — 82550 ASSAY OF CK (CPK): CPT | Performed by: INTERNAL MEDICINE

## 2017-09-18 PROCEDURE — 12000008 ZZH R&B INTERMEDIATE UMMC

## 2017-09-18 PROCEDURE — 80053 COMPREHEN METABOLIC PANEL: CPT | Performed by: STUDENT IN AN ORGANIZED HEALTH CARE EDUCATION/TRAINING PROGRAM

## 2017-09-18 PROCEDURE — 99222 1ST HOSP IP/OBS MODERATE 55: CPT | Performed by: PSYCHIATRY & NEUROLOGY

## 2017-09-18 PROCEDURE — 83735 ASSAY OF MAGNESIUM: CPT | Performed by: STUDENT IN AN ORGANIZED HEALTH CARE EDUCATION/TRAINING PROGRAM

## 2017-09-18 RX ORDER — LIDOCAINE 40 MG/G
CREAM TOPICAL
Status: DISCONTINUED | OUTPATIENT
Start: 2017-09-18 | End: 2017-09-24 | Stop reason: HOSPADM

## 2017-09-18 RX ORDER — MULTIPLE VITAMINS W/ MINERALS TAB 9MG-400MCG
1 TAB ORAL DAILY
Status: DISCONTINUED | OUTPATIENT
Start: 2017-09-18 | End: 2017-09-24 | Stop reason: HOSPADM

## 2017-09-18 RX ORDER — ONDANSETRON 2 MG/ML
4 INJECTION INTRAMUSCULAR; INTRAVENOUS
Status: COMPLETED | OUTPATIENT
Start: 2017-09-18 | End: 2017-09-18

## 2017-09-18 RX ORDER — LORAZEPAM 1 MG/1
1-4 TABLET ORAL EVERY 30 MIN PRN
Status: DISCONTINUED | OUTPATIENT
Start: 2017-09-18 | End: 2017-09-20

## 2017-09-18 RX ORDER — ONDANSETRON 2 MG/ML
4 INJECTION INTRAMUSCULAR; INTRAVENOUS EVERY 6 HOURS PRN
Status: DISCONTINUED | OUTPATIENT
Start: 2017-09-18 | End: 2017-09-24 | Stop reason: HOSPADM

## 2017-09-18 RX ORDER — METOCLOPRAMIDE HYDROCHLORIDE 5 MG/ML
10 INJECTION INTRAMUSCULAR; INTRAVENOUS EVERY 6 HOURS PRN
Status: DISCONTINUED | OUTPATIENT
Start: 2017-09-18 | End: 2017-09-24 | Stop reason: HOSPADM

## 2017-09-18 RX ORDER — PROCHLORPERAZINE MALEATE 5 MG
5-10 TABLET ORAL EVERY 6 HOURS PRN
Status: DISCONTINUED | OUTPATIENT
Start: 2017-09-18 | End: 2017-09-24 | Stop reason: HOSPADM

## 2017-09-18 RX ORDER — PROCHLORPERAZINE 25 MG
25 SUPPOSITORY, RECTAL RECTAL EVERY 12 HOURS PRN
Status: DISCONTINUED | OUTPATIENT
Start: 2017-09-18 | End: 2017-09-24 | Stop reason: HOSPADM

## 2017-09-18 RX ORDER — NALOXONE HYDROCHLORIDE 0.4 MG/ML
.1-.4 INJECTION, SOLUTION INTRAMUSCULAR; INTRAVENOUS; SUBCUTANEOUS
Status: DISCONTINUED | OUTPATIENT
Start: 2017-09-18 | End: 2017-09-24 | Stop reason: HOSPADM

## 2017-09-18 RX ORDER — AMOXICILLIN 250 MG
1-2 CAPSULE ORAL 2 TIMES DAILY PRN
Status: DISCONTINUED | OUTPATIENT
Start: 2017-09-18 | End: 2017-09-24 | Stop reason: HOSPADM

## 2017-09-18 RX ORDER — LANOLIN ALCOHOL/MO/W.PET/CERES
100 CREAM (GRAM) TOPICAL DAILY
Status: DISPENSED | OUTPATIENT
Start: 2017-09-18 | End: 2017-09-21

## 2017-09-18 RX ORDER — FOLIC ACID 1 MG/1
1 TABLET ORAL DAILY
Status: DISCONTINUED | OUTPATIENT
Start: 2017-09-18 | End: 2017-09-24 | Stop reason: HOSPADM

## 2017-09-18 RX ORDER — SODIUM CHLORIDE 9 MG/ML
INJECTION, SOLUTION INTRAVENOUS CONTINUOUS
Status: ACTIVE | OUTPATIENT
Start: 2017-09-18 | End: 2017-09-19

## 2017-09-18 RX ORDER — POLYETHYLENE GLYCOL 3350 17 G/17G
17 POWDER, FOR SOLUTION ORAL DAILY PRN
Status: DISCONTINUED | OUTPATIENT
Start: 2017-09-18 | End: 2017-09-24 | Stop reason: HOSPADM

## 2017-09-18 RX ORDER — METOCLOPRAMIDE 10 MG/1
10 TABLET ORAL EVERY 6 HOURS PRN
Status: DISCONTINUED | OUTPATIENT
Start: 2017-09-18 | End: 2017-09-24 | Stop reason: HOSPADM

## 2017-09-18 RX ORDER — ONDANSETRON 4 MG/1
4 TABLET, ORALLY DISINTEGRATING ORAL EVERY 6 HOURS PRN
Status: DISCONTINUED | OUTPATIENT
Start: 2017-09-18 | End: 2017-09-24 | Stop reason: HOSPADM

## 2017-09-18 RX ADMIN — SODIUM CHLORIDE: 9 INJECTION, SOLUTION INTRAVENOUS at 03:14

## 2017-09-18 RX ADMIN — ONDANSETRON 4 MG: 2 INJECTION INTRAMUSCULAR; INTRAVENOUS at 03:53

## 2017-09-18 RX ADMIN — SODIUM CHLORIDE: 9 INJECTION, SOLUTION INTRAVENOUS at 20:58

## 2017-09-18 ASSESSMENT — ENCOUNTER SYMPTOMS
ABDOMINAL PAIN: 0
AGITATION: 1
SHORTNESS OF BREATH: 0
SEIZURES: 1
FEVER: 0

## 2017-09-18 ASSESSMENT — ACTIVITIES OF DAILY LIVING (ADL)
RETIRED_EATING: 0-->INDEPENDENT
FALL_HISTORY_WITHIN_LAST_SIX_MONTHS: YES
TRANSFERRING: 0-->INDEPENDENT
RETIRED_COMMUNICATION: 0-->UNDERSTANDS/COMMUNICATES WITHOUT DIFFICULTY
COGNITION: 0 - NO COGNITION ISSUES REPORTED
DRESS: 0-->INDEPENDENT
AMBULATION: 0-->INDEPENDENT
TOILETING: 0-->INDEPENDENT
SWALLOWING: 0-->SWALLOWS FOODS/LIQUIDS WITHOUT DIFFICULTY
BATHING: 0-->INDEPENDENT
NUMBER_OF_TIMES_PATIENT_HAS_FALLEN_WITHIN_LAST_SIX_MONTHS: 3

## 2017-09-18 NOTE — PROGRESS NOTES
Pt arrived on unit via transport from [place] ED- walked from transport bed into room.  Steady on feet.  Belongings at bedside- see Summary of Care note.  L PIV saline locked.  Pt awaiting mom's arrival from [place] ED.  Pt admitted by writer.

## 2017-09-18 NOTE — ED PROVIDER NOTES
History     Chief Complaint   Patient presents with     Addiction Problem     Behavioral Problem     HPI  Daniela Byrne is a 22 year old female who arrived in the emergency room transported by ambulance after having had 2 witnessed seizures.  Patient has been abusing drugs including methamphetamine and possibly LSD as well as alcohol.  Today she had a seizure witnessed by her friends family was called on arrival patient's mother saw her having a 2nd seizure which lasted 2-4 minutes generalized tonic-clonic foaming at the mouth with approximately 10 minutes of postictal type behavior.  Patient was combative after ambulance was called and she was given ketamine injection in route she was heavily sedated when she 1st arrived here in the emergency room but then quickly became alert she is somewhat confused but cooperative in stating that she feels like she just got really high.  Patient denies any suicidal ideation she states that she was strictly using the drugs to get high.  The patient has had chronic drug abuse issues patient's mother thought that she was no longer using methamphetamine and was surprised that she had used meth amphetamine yesterday and today.    I have reviewed the Medications, Allergies, Past Medical and Surgical History, and Social History in the Epic system.    Review of Systems   Constitutional: Negative for fever.   Respiratory: Negative for shortness of breath.    Cardiovascular: Negative for chest pain.   Gastrointestinal: Negative for abdominal pain.   Neurological: Positive for seizures.   Psychiatric/Behavioral: Positive for agitation.   All other systems reviewed and are negative.      Physical Exam   BP: 160/87  Heart Rate: 135  Temp: 100  F (37.8  C)  Resp: 18  SpO2: 95 %  Physical Exam   Constitutional: She is oriented to person, place, and time. No distress.   HENT:   Head: Atraumatic.   Mouth/Throat: Oropharynx is clear and moist. No oropharyngeal exudate.   Eyes: Pupils are  equal, round, and reactive to light. No scleral icterus.   Cardiovascular: Normal heart sounds and intact distal pulses.    Pulmonary/Chest: Breath sounds normal. No respiratory distress.   Abdominal: Soft. Bowel sounds are normal. There is no tenderness.   Musculoskeletal: She exhibits no edema or tenderness.   Neurological: She is alert and oriented to person, place, and time. She has normal reflexes. No cranial nerve deficit. She exhibits normal muscle tone. Coordination normal.   Skin: Skin is warm. No rash noted. She is not diaphoretic.   Psychiatric: She is agitated. She expresses impulsivity. She expresses no suicidal ideation.       ED Course     ED Course     Procedures         EKG Interpretation:      Interpreted by Brain Lopez  Time reviewed: 2154  Symptoms at time of EKG: drug use   Rhythm: normal sinus   Rate: tachy  Axis: normal  Ectopy: none  Conduction: normal  ST Segments/ T Waves: No ST-T wave changes  Q Waves: none  Comparison to prior: Unchanged    Clinical Impression: normal EKG            Critical Care time:  none  Lactate is greater than 2 due to ingestion dehydration along with seizure, at this time there is no sign of sepsis. repeat lactic was 0.9       Labs Ordered and Resulted from Time of ED Arrival Up to the Time of Departure from the ED   ALCOHOL ETHYL - Abnormal; Notable for the following:        Result Value    Ethanol g/dL 0.02 (*)     All other components within normal limits   CBC WITH PLATELETS DIFFERENTIAL - Abnormal; Notable for the following:     WBC 12.0 (*)     MCH 35.3 (*)     Absolute Neutrophil 10.9 (*)     Absolute Lymphocytes 0.6 (*)     All other components within normal limits   COMPREHENSIVE METABOLIC PANEL - Abnormal; Notable for the following:     Carbon Dioxide 18 (*)     Anion Gap 15 (*)     Glucose 107 (*)     Urea Nitrogen 5 (*)     Protein Total 9.0 (*)     All other components within normal limits   LACTIC ACID WHOLE BLOOD - Abnormal; Notable for  the following:     Lactic Acid 4.5 (*)     All other components within normal limits   DRUG ABUSE SCREEN 6 CHEM DEP URINE ([facility]) - Abnormal; Notable for the following:     Amphetamine Qual Urine Positive (*)     Ethanol Qual Urine Positive (*)     All other components within normal limits   ROUTINE UA WITH MICROSCOPIC REFLEX TO CULTURE - Abnormal; Notable for the following:     Ketones Urine 10 (*)     Blood Urine Small (*)     Protein Albumin Urine 100 (*)     Mucous Urine Present (*)     All other components within normal limits   HCG QUAL URINE POCT - Normal   ACETAMINOPHEN LEVEL   LIPASE   TSH WITH FREE T4 REFLEX   SALICYLATE LEVEL       Patient with initial lactic acid of 4.5 after a liter fluid flush patient's lactic was 0.9.    Assessments & Plan (with Medical Decision Making)       I have reviewed the nursing notes.    I have reviewed the findings, diagnosis, plan and need for follow up with the patient.  Patient will be admitted for further evaluation and treatment of her drug ingestion and possible seizures I discussed the case with both the admitting medical team as well as the neurologist on staff for consults patient will be transported to the [place] for further stabilization and treatment and further evaluation by neurology.    New Prescriptions    No medications on file       Final diagnoses:   Purposeful non-suicidal drug ingestion, initial encounter (H)   Methamphetamine abuse   LSD reaction   Seizures (H)       9/17/2017   [facility], [place], EMERGENCY DEPARTMENT     Brain Lopez MD  09/18/17 0027

## 2017-09-18 NOTE — PHARMACY-ADMISSION MEDICATION HISTORY
Pharmacy Admission Medication History    Admission medication history interview status for the 9/17/2017 admission is complete.   See EPIC admission navigator for allergy information, prior to admission medications and immunization status.   Medication history interview source(s): Patient  Medication history resources (including written lists, pill bottles, clinic record): None  Medication history source reliability: Good    Actions taken by pharmacist (provider contacted, medication changes, etc):None     Changes made to medication history:    Removed from medication list: Pulmicort inhaler 180 mcg 1 puff BID, Albuterol nebulizer 2.5 mg Q4 PRN    Additional medication history information: None    Medication reconciliation/reorder completed by provider prior to medication history? Yes    Time spent in this activity: 15 MINUTES    Prior to Admission medications    Medication Sig Last Dose Taking? Auth Provider   medroxyPROGESTERone (DEPO-PROVERA) 150 MG/ML injection Inject 150 mg into the muscle every 3 months Reported on 3/22/2017 More than a month at Unknown time  Reported, Patient   albuterol (PROAIR HFA, PROVENTIL HFA, VENTOLIN HFA) 108 (90 BASE) MCG/ACT inhaler Inhale 2 puffs into the lungs every 4 hours as needed for shortness of breath / dyspnea More than a month at Unknown time  Larisa Espinal NP

## 2017-09-18 NOTE — PLAN OF CARE
Problem: Goal Outcome Summary  Goal: Goal Outcome Summary  Patient has been sleeping since 1000 this am. Patient has no interest in stopping drinking or doing drugs. Patient had no seizure activity. P:cont to montior

## 2017-09-18 NOTE — CONSULTS
Patient seen and chart reviewed. Note dictated (initial)   She obviously has a severe substance use disorder, but has no interest in a CD treatment and we cannot force one.   Was drinking about 1.75 L of rum for several years, but none for over 24 hours prior to the seizure; likely alcohol withdrawal seizure. Consider MSSA protocol?   She is not suicidal, no risk of self harm in the hospital; sitter and suicide precautions may be d/cd. No criteria for admission to IP psych.  page me at [phone] as needed
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
## 2017-09-18 NOTE — PROGRESS NOTES
"Social Work: Assessment with Discharge Plan    Patient Name:  Daniela Byrne  :  1995  Age:  22 year old  MRN:  [id number]  Risk/Complexity Score:  Filed Complexity Screen Score: 7  Completed assessment with:  Patient    Presenting Information   Reason for Referral:  Substance abuse concerns  Date of Intake:  2017  Referral Source:  Chart Review  Decision Maker:  Patient  Alternate Decision Maker:  Jovani JOHNSON, pt's mother Kirstin   Health Care Directive:  None on file  Living Situation:  \"Couch-hops\"  Previous Functional Status:  Independent  Patient and family understanding of hospitalization:  Pt states that she is being hospitalized for having seizures 2/2 to alcohol withdrawal  Cultural/Language/Spiritual Considerations:  None identified  Adjustment to Illness:  Pt seems to lack insight into the severity of her drug and alcohol use     Physical Health  Reason for Admission:    1. Purposeful non-suicidal drug ingestion, initial encounter (H)    2. Methamphetamine abuse    3. LSD reaction    4. Seizures (H)      Services Needed/Recommended:  Home with no services    Mental Health/Chemical Dependency  Diagnosis:  Polysubstance dependence, alcohol-related anxiety disorder, hx of agoraphobia  Support/Services in Place:  None  Services Needed/Recommended:  Chemical dependency treatment recommended- pt declining    Support System  Significant relationship at present time:  None  Family of origin is available for support:  Pt's mother  Other support available:  None identified  Gaps in support system:  Pt seems to have lack of sober supports  Patient is caregiver to:  None     Provider Information   Primary Care Physician:  Larisa Espinal   [phone]   Clinic:  [address] [place] [id number]      :  None    Financial   Income Source:  None  Financial Concerns:  Pt states she has no financial concerns  Insurance:    Payor/Plan Subscriber Name Rel Member # Group # " "  [employer] [facility] SANDRA MONTGOMERY  [id number]      [address]       Discharge Plan   Patient and family discharge goal:  Home  Provided education on discharge plan:  YES  Patient agreeable to discharge plan:  YES  Barriers to discharge:  Medical stability    Discharge Recommendations   Anticipated Disposition:  Home, no needs identified  Transportation Needs:  Family:  Pt's mother  Name of Transportation Company and Phone:  N/A    Additional comments   NESTOR and RNCC Thuy met with pt to discuss substance abuse concerns and discharge plan. Pt states that she has been a heavy drinker for >10 years. Pt states that the psychiatrist told her that she has been having seizures d/t alcohol withdrawal. Pt states that this makes sense to her because she has used acid many times and did not have seizures from it. She states that she has been \"couch-hopping\" but will stay with her mother at discharge. Asked if pt is interested in substance abuse treatment resources and pt stated no. Pt has completed several treatment programs and is not interested in attending another. Pt states that she is not working or in school right now but is applying for a job at [employer]. Pt states she has no questions or concerns.     VINICIUS Amaya, LGSW  5A Unit   Pager [phone]  Phone [phone]      "

## 2017-09-18 NOTE — ED NOTES
Patient arrived by ambulance and is incoherent, confused and disoriented; patient stairs out into space; EMS staff verbalized that they were notified by patient's mother and picked up patient at mother's home; patient was zoomed out and stairing into space; EMS staff was told by mother and sister that patient drank alcohol, consumed acid and methaphetamin at noon today and had two seizures, last at 5pm; patient had multiple emesis and became combative during transport and was placed in 4 points restraints; EMS staff verbalized that according to patient's mother, patient has no Epilepsy history. EMS staff verbalized she was administered Ketamine by them because she was combative and aggressive; restraints removed upon arrival; patient is incoherant, unsteady on feet and confused; bruising on skin and has a PIV in kash left antecubital region.
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
## 2017-09-18 NOTE — PLAN OF CARE
Problem: Goal Outcome Summary  Goal: Goal Outcome Summary  Outcome: No Change  AOx4.  Pt overstimulated and displaying attention-seeking behavior.  Up ad renée in room, and steady on feet.  Pt placed on both seizure and suicide precautions- educated on both (mother as well).  Bedside attendant.  Zofran administered X1.  IVMF infusing per order.  Tele.  Pt able to make needs known.  Monitor closely for seizure activity and with POC.

## 2017-09-18 NOTE — PROGRESS NOTES
"  Care Coordinator Progress Note     Admission Date/Time:  9/17/2017  Attending MD:  Jeff Molina MD     Data  Chart reviewed, discussed with interdisciplinary team.   Patient was admitted for:    Purposeful non-suicidal drug ingestion, initial encounter (H)  Methamphetamine abuse  LSD reaction  Seizures (H).    Concerns with insurance coverage for discharge needs: None.  Current Living Situation: Patient is \"couch hopping\" but plans to stay with mom upon discharge.  Support System: Involved  Services Involved: No services involved prior to admission   Transportation: Family or friend will provide  Barriers to Discharge: substance abuse     Assessment  Pt is a 22 year old female with PMH significant for alcohol abuse, substance abuse, and anxiety who was admitted for witnessed seizure activity. Per chart review, pt reports frequent alcohol and drug use. Writer and Acacia Wu, Medicine SW, met with pt to discuss discharge planning. Pt stated that she has been \"couch hopping\" but plans to stay to with mom on discharge. Pt stated her mom is her primary support person. Pt declined wanting chemical dependency resources. Will continue to follow plan of care and assist with discharge planning as needed.     Plan  Anticipated Discharge Date:  9/19/17  Anticipated Discharge Plan: Discharge to pt's mother house    Thuy Loaiza RN        "

## 2017-09-18 NOTE — SUMMARY OF CARE
Pt arrived on unit with following outside belongings: shirt and pants.  Belongings in cart in [place] d/t suicide precautions.
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
## 2017-09-19 LAB
ANION GAP SERPL CALCULATED.3IONS-SCNC: 12 MMOL/L (ref 3–14)
ANION GAP SERPL CALCULATED.3IONS-SCNC: 7 MMOL/L (ref 3–14)
BUN SERPL-MCNC: 5 MG/DL (ref 7–30)
BUN SERPL-MCNC: 7 MG/DL (ref 7–30)
CALCIUM SERPL-MCNC: 7.9 MG/DL (ref 8.5–10.1)
CALCIUM SERPL-MCNC: 8.2 MG/DL (ref 8.5–10.1)
CHLORIDE SERPL-SCNC: 110 MMOL/L (ref 94–109)
CHLORIDE SERPL-SCNC: 116 MMOL/L (ref 94–109)
CK SERPL-CCNC: 3563 U/L (ref 30–225)
CK SERPL-CCNC: NORMAL U/L (ref 30–225)
CK SERPL-CCNC: NORMAL U/L (ref 30–225)
CO2 SERPL-SCNC: 15 MMOL/L (ref 20–32)
CO2 SERPL-SCNC: 22 MMOL/L (ref 20–32)
CREAT SERPL-MCNC: 0.53 MG/DL (ref 0.52–1.04)
CREAT SERPL-MCNC: 0.56 MG/DL (ref 0.52–1.04)
ERYTHROCYTE [DISTWIDTH] IN BLOOD BY AUTOMATED COUNT: 13.7 % (ref 10–15)
ERYTHROCYTE [DISTWIDTH] IN BLOOD BY AUTOMATED COUNT: 13.9 % (ref 10–15)
ERYTHROCYTE [DISTWIDTH] IN BLOOD BY AUTOMATED COUNT: NORMAL % (ref 10–15)
GFR SERPL CREATININE-BSD FRML MDRD: >90 ML/MIN/1.7M2
GFR SERPL CREATININE-BSD FRML MDRD: >90 ML/MIN/1.7M2
GLUCOSE SERPL-MCNC: 77 MG/DL (ref 70–99)
GLUCOSE SERPL-MCNC: 83 MG/DL (ref 70–99)
HCT VFR BLD AUTO: 32 % (ref 35–47)
HCT VFR BLD AUTO: 32.8 % (ref 35–47)
HCT VFR BLD AUTO: NORMAL % (ref 35–47)
HGB BLD-MCNC: 10.7 G/DL (ref 11.7–15.7)
HGB BLD-MCNC: 11 G/DL (ref 11.7–15.7)
HGB BLD-MCNC: NORMAL G/DL (ref 11.7–15.7)
INTERPRETATION ECG - MUSE: NORMAL
MCH RBC QN AUTO: 34.6 PG (ref 26.5–33)
MCH RBC QN AUTO: 35.1 PG (ref 26.5–33)
MCH RBC QN AUTO: NORMAL PG (ref 26.5–33)
MCHC RBC AUTO-ENTMCNC: 33.4 G/DL (ref 31.5–36.5)
MCHC RBC AUTO-ENTMCNC: 33.5 G/DL (ref 31.5–36.5)
MCHC RBC AUTO-ENTMCNC: NORMAL G/DL (ref 31.5–36.5)
MCV RBC AUTO: 104 FL (ref 78–100)
MCV RBC AUTO: 105 FL (ref 78–100)
MCV RBC AUTO: NORMAL FL (ref 78–100)
PLATELET # BLD AUTO: 80 10E9/L (ref 150–450)
PLATELET # BLD AUTO: 96 10E9/L (ref 150–450)
PLATELET # BLD AUTO: NORMAL 10E9/L (ref 150–450)
POTASSIUM SERPL-SCNC: 3.9 MMOL/L (ref 3.4–5.3)
POTASSIUM SERPL-SCNC: 5 MMOL/L (ref 3.4–5.3)
RBC # BLD AUTO: 3.09 10E12/L (ref 3.8–5.2)
RBC # BLD AUTO: 3.13 10E12/L (ref 3.8–5.2)
RBC # BLD AUTO: NORMAL 10E12/L (ref 3.8–5.2)
SODIUM SERPL-SCNC: 139 MMOL/L (ref 133–144)
SODIUM SERPL-SCNC: 142 MMOL/L (ref 133–144)
WBC # BLD AUTO: 3.9 10E9/L (ref 4–11)
WBC # BLD AUTO: 4 10E9/L (ref 4–11)
WBC # BLD AUTO: NORMAL 10E9/L (ref 4–11)

## 2017-09-19 PROCEDURE — 25000132 ZZH RX MED GY IP 250 OP 250 PS 637: Performed by: STUDENT IN AN ORGANIZED HEALTH CARE EDUCATION/TRAINING PROGRAM

## 2017-09-19 PROCEDURE — 36415 COLL VENOUS BLD VENIPUNCTURE: CPT | Performed by: STUDENT IN AN ORGANIZED HEALTH CARE EDUCATION/TRAINING PROGRAM

## 2017-09-19 PROCEDURE — 82550 ASSAY OF CK (CPK): CPT | Performed by: STUDENT IN AN ORGANIZED HEALTH CARE EDUCATION/TRAINING PROGRAM

## 2017-09-19 PROCEDURE — 12000001 ZZH R&B MED SURG/OB UMMC

## 2017-09-19 PROCEDURE — 25000128 H RX IP 250 OP 636: Performed by: STUDENT IN AN ORGANIZED HEALTH CARE EDUCATION/TRAINING PROGRAM

## 2017-09-19 PROCEDURE — 99233 SBSQ HOSP IP/OBS HIGH 50: CPT | Mod: GC | Performed by: INTERNAL MEDICINE

## 2017-09-19 PROCEDURE — 25000125 ZZHC RX 250: Performed by: INTERNAL MEDICINE

## 2017-09-19 PROCEDURE — 36415 COLL VENOUS BLD VENIPUNCTURE: CPT | Performed by: INTERNAL MEDICINE

## 2017-09-19 PROCEDURE — 80048 BASIC METABOLIC PNL TOTAL CA: CPT | Performed by: STUDENT IN AN ORGANIZED HEALTH CARE EDUCATION/TRAINING PROGRAM

## 2017-09-19 PROCEDURE — 12000008 ZZH R&B INTERMEDIATE UMMC

## 2017-09-19 PROCEDURE — 82550 ASSAY OF CK (CPK): CPT | Performed by: INTERNAL MEDICINE

## 2017-09-19 PROCEDURE — 80048 BASIC METABOLIC PNL TOTAL CA: CPT | Performed by: INTERNAL MEDICINE

## 2017-09-19 PROCEDURE — 25000132 ZZH RX MED GY IP 250 OP 250 PS 637: Performed by: INTERNAL MEDICINE

## 2017-09-19 PROCEDURE — 85027 COMPLETE CBC AUTOMATED: CPT | Performed by: STUDENT IN AN ORGANIZED HEALTH CARE EDUCATION/TRAINING PROGRAM

## 2017-09-19 PROCEDURE — 85027 COMPLETE CBC AUTOMATED: CPT | Performed by: INTERNAL MEDICINE

## 2017-09-19 RX ORDER — SODIUM CHLORIDE 9 MG/ML
INJECTION, SOLUTION INTRAVENOUS CONTINUOUS
Status: ACTIVE | OUTPATIENT
Start: 2017-09-19 | End: 2017-09-20

## 2017-09-19 RX ORDER — ALBUTEROL SULFATE 90 UG/1
2 AEROSOL, METERED RESPIRATORY (INHALATION) EVERY 4 HOURS PRN
Status: DISCONTINUED | OUTPATIENT
Start: 2017-09-19 | End: 2017-09-24 | Stop reason: HOSPADM

## 2017-09-19 RX ADMIN — ALBUTEROL SULFATE 2 PUFF: 90 AEROSOL, METERED RESPIRATORY (INHALATION) at 20:50

## 2017-09-19 RX ADMIN — FOLIC ACID 1 MG: 1 TABLET ORAL at 10:23

## 2017-09-19 RX ADMIN — SODIUM CHLORIDE: 9 INJECTION, SOLUTION INTRAVENOUS at 22:49

## 2017-09-19 RX ADMIN — Medication: at 23:10

## 2017-09-19 RX ADMIN — SODIUM CHLORIDE: 9 INJECTION, SOLUTION INTRAVENOUS at 06:42

## 2017-09-19 RX ADMIN — MULTIPLE VITAMINS W/ MINERALS TAB 1 TABLET: TAB at 10:24

## 2017-09-19 RX ADMIN — Medication 100 MG: at 10:24

## 2017-09-19 RX ADMIN — SODIUM CHLORIDE: 9 INJECTION, SOLUTION INTRAVENOUS at 16:13

## 2017-09-19 NOTE — PLAN OF CARE
Problem: Goal Outcome Summary  Goal: Goal Outcome Summary  Outcome: Improving  Patient ate breakfast/lunch about half. Patient slept most of day easy arousable.. Patient up independent in room. Ck down half from yesterday. May dc tomorrow. Scores on mssa and cow low. P;cont to monitor

## 2017-09-19 NOTE — PLAN OF CARE
Problem: Goal Outcome Summary  Goal: Goal Outcome Summary  During the Noc shift lab attempted to draw the CK level x 3 times each time the blood sample would hemolyzed by the time it reach the lab department. The patient has been receiving 200 ml/hr of NS via PIV. MSSA score was observed to be 1 this shift. Continue to monitor the patient and follow the POC. The patient is becoming very frustrated with the blood drawing process.

## 2017-09-19 NOTE — PLAN OF CARE
Problem: Goal Outcome Summary  Goal: Goal Outcome Summary  Outcome: Improving  D/I  Uneventful shift, Mom here to visit and watched TV with patient.  Appropriate and cooperative.  Moved to private room ([facility]) on 5B, ambulated to the room and was steady on her feet.  Pt remains with seizure pads in place, suicide precautions removed per Psych recommendations.  IVMF continue per order.  Tele remains. CK drawn at 8 pm, waiting results.   Pt able to make needs known.  Monitor closely for seizure activity and continue with POC.

## 2017-09-20 LAB
ALBUMIN UR-MCNC: NEGATIVE MG/DL
APPEARANCE UR: CLEAR
BILIRUB UR QL STRIP: NEGATIVE
CK SERPL-CCNC: 6769 U/L (ref 30–225)
CK SERPL-CCNC: 7043 U/L (ref 30–225)
CK SERPL-CCNC: 8581 U/L (ref 30–225)
COLOR UR AUTO: ABNORMAL
CREAT SERPL-MCNC: 0.54 MG/DL (ref 0.52–1.04)
ERYTHROCYTE [DISTWIDTH] IN BLOOD BY AUTOMATED COUNT: 13.5 % (ref 10–15)
GFR SERPL CREATININE-BSD FRML MDRD: >90 ML/MIN/1.7M2
GLUCOSE UR STRIP-MCNC: NEGATIVE MG/DL
HBV CORE IGM SERPL QL IA: NONREACTIVE
HBV SURFACE AB SERPL IA-ACNC: 6.86 M[IU]/ML
HBV SURFACE AG SERPL QL IA: NONREACTIVE
HCT VFR BLD AUTO: 33.6 % (ref 35–47)
HCV AB SERPL QL IA: NONREACTIVE
HGB BLD-MCNC: 11.2 G/DL (ref 11.7–15.7)
HGB UR QL STRIP: ABNORMAL
HIV 1+2 AB+HIV1 P24 AG SERPL QL IA: NONREACTIVE
KETONES UR STRIP-MCNC: NEGATIVE MG/DL
LEUKOCYTE ESTERASE UR QL STRIP: NEGATIVE
MAGNESIUM SERPL-MCNC: 2 MG/DL (ref 1.6–2.3)
MCH RBC QN AUTO: 34.7 PG (ref 26.5–33)
MCHC RBC AUTO-ENTMCNC: 33.3 G/DL (ref 31.5–36.5)
MCV RBC AUTO: 104 FL (ref 78–100)
MUCOUS THREADS #/AREA URNS LPF: PRESENT /LPF
NITRATE UR QL: NEGATIVE
PH UR STRIP: 6.5 PH (ref 5–7)
PHOSPHATE SERPL-MCNC: 3.2 MG/DL (ref 2.5–4.5)
PLATELET # BLD AUTO: 102 10E9/L (ref 150–450)
POTASSIUM SERPL-SCNC: 4.4 MMOL/L (ref 3.4–5.3)
RBC # BLD AUTO: 3.23 10E12/L (ref 3.8–5.2)
RBC #/AREA URNS AUTO: 9 /HPF (ref 0–2)
SOURCE: ABNORMAL
SP GR UR STRIP: 1.01 (ref 1–1.03)
SQUAMOUS #/AREA URNS AUTO: <1 /HPF (ref 0–1)
UROBILINOGEN UR STRIP-MCNC: NORMAL MG/DL (ref 0–2)
WBC # BLD AUTO: 4.3 10E9/L (ref 4–11)
WBC #/AREA URNS AUTO: 1 /HPF (ref 0–2)

## 2017-09-20 PROCEDURE — 12000008 ZZH R&B INTERMEDIATE UMMC

## 2017-09-20 PROCEDURE — 80307 DRUG TEST PRSMV CHEM ANLYZR: CPT | Performed by: STUDENT IN AN ORGANIZED HEALTH CARE EDUCATION/TRAINING PROGRAM

## 2017-09-20 PROCEDURE — 25000132 ZZH RX MED GY IP 250 OP 250 PS 637: Performed by: INTERNAL MEDICINE

## 2017-09-20 PROCEDURE — 36415 COLL VENOUS BLD VENIPUNCTURE: CPT | Performed by: INTERNAL MEDICINE

## 2017-09-20 PROCEDURE — 83735 ASSAY OF MAGNESIUM: CPT | Performed by: INTERNAL MEDICINE

## 2017-09-20 PROCEDURE — 87389 HIV-1 AG W/HIV-1&-2 AB AG IA: CPT | Performed by: STUDENT IN AN ORGANIZED HEALTH CARE EDUCATION/TRAINING PROGRAM

## 2017-09-20 PROCEDURE — 86705 HEP B CORE ANTIBODY IGM: CPT | Performed by: STUDENT IN AN ORGANIZED HEALTH CARE EDUCATION/TRAINING PROGRAM

## 2017-09-20 PROCEDURE — 36415 COLL VENOUS BLD VENIPUNCTURE: CPT | Performed by: STUDENT IN AN ORGANIZED HEALTH CARE EDUCATION/TRAINING PROGRAM

## 2017-09-20 PROCEDURE — 40000358 ZZHCL STATISTIC DRUG SCREEN MULTIPLE (METRO): Performed by: STUDENT IN AN ORGANIZED HEALTH CARE EDUCATION/TRAINING PROGRAM

## 2017-09-20 PROCEDURE — 82565 ASSAY OF CREATININE: CPT | Performed by: STUDENT IN AN ORGANIZED HEALTH CARE EDUCATION/TRAINING PROGRAM

## 2017-09-20 PROCEDURE — 84100 ASSAY OF PHOSPHORUS: CPT | Performed by: STUDENT IN AN ORGANIZED HEALTH CARE EDUCATION/TRAINING PROGRAM

## 2017-09-20 PROCEDURE — 81001 URINALYSIS AUTO W/SCOPE: CPT | Performed by: STUDENT IN AN ORGANIZED HEALTH CARE EDUCATION/TRAINING PROGRAM

## 2017-09-20 PROCEDURE — 99233 SBSQ HOSP IP/OBS HIGH 50: CPT | Mod: GC | Performed by: INTERNAL MEDICINE

## 2017-09-20 PROCEDURE — 83735 ASSAY OF MAGNESIUM: CPT | Performed by: STUDENT IN AN ORGANIZED HEALTH CARE EDUCATION/TRAINING PROGRAM

## 2017-09-20 PROCEDURE — 82550 ASSAY OF CK (CPK): CPT | Performed by: STUDENT IN AN ORGANIZED HEALTH CARE EDUCATION/TRAINING PROGRAM

## 2017-09-20 PROCEDURE — 86706 HEP B SURFACE ANTIBODY: CPT | Performed by: STUDENT IN AN ORGANIZED HEALTH CARE EDUCATION/TRAINING PROGRAM

## 2017-09-20 PROCEDURE — 84132 ASSAY OF SERUM POTASSIUM: CPT | Performed by: STUDENT IN AN ORGANIZED HEALTH CARE EDUCATION/TRAINING PROGRAM

## 2017-09-20 PROCEDURE — 87340 HEPATITIS B SURFACE AG IA: CPT | Performed by: STUDENT IN AN ORGANIZED HEALTH CARE EDUCATION/TRAINING PROGRAM

## 2017-09-20 PROCEDURE — 82550 ASSAY OF CK (CPK): CPT | Performed by: INTERNAL MEDICINE

## 2017-09-20 PROCEDURE — 86803 HEPATITIS C AB TEST: CPT | Performed by: STUDENT IN AN ORGANIZED HEALTH CARE EDUCATION/TRAINING PROGRAM

## 2017-09-20 PROCEDURE — 25000128 H RX IP 250 OP 636: Performed by: INTERNAL MEDICINE

## 2017-09-20 PROCEDURE — 85027 COMPLETE CBC AUTOMATED: CPT | Performed by: STUDENT IN AN ORGANIZED HEALTH CARE EDUCATION/TRAINING PROGRAM

## 2017-09-20 RX ORDER — SODIUM CHLORIDE, SODIUM LACTATE, POTASSIUM CHLORIDE, CALCIUM CHLORIDE 600; 310; 30; 20 MG/100ML; MG/100ML; MG/100ML; MG/100ML
INJECTION, SOLUTION INTRAVENOUS CONTINUOUS
Status: ACTIVE | OUTPATIENT
Start: 2017-09-20 | End: 2017-09-21

## 2017-09-20 RX ADMIN — SODIUM CHLORIDE, POTASSIUM CHLORIDE, SODIUM LACTATE AND CALCIUM CHLORIDE 1000 ML: 600; 310; 30; 20 INJECTION, SOLUTION INTRAVENOUS at 22:34

## 2017-09-20 RX ADMIN — SODIUM CHLORIDE, POTASSIUM CHLORIDE, SODIUM LACTATE AND CALCIUM CHLORIDE: 600; 310; 30; 20 INJECTION, SOLUTION INTRAVENOUS at 18:11

## 2017-09-20 RX ADMIN — MULTIPLE VITAMINS W/ MINERALS TAB 1 TABLET: TAB at 08:01

## 2017-09-20 RX ADMIN — LORAZEPAM 1 MG: 1 TABLET ORAL at 10:16

## 2017-09-20 RX ADMIN — FOLIC ACID 1 MG: 1 TABLET ORAL at 08:01

## 2017-09-20 RX ADMIN — Medication 100 MG: at 08:01

## 2017-09-20 RX ADMIN — SODIUM CHLORIDE, POTASSIUM CHLORIDE, SODIUM LACTATE AND CALCIUM CHLORIDE 1000 ML: 600; 310; 30; 20 INJECTION, SOLUTION INTRAVENOUS at 08:00

## 2017-09-20 NOTE — PLAN OF CARE
Problem: Goal Outcome Summary  Goal: Goal Outcome Summary  Outcome: No Change  VSS except baseline bonita.  A/Ox4.  Up ad renée independently.  No c/o pain.  Pt showered this evening.  Good appetite.  Voiding adequately.  Pt's mom present this evening.  Plan to possibly d/c tomorrow once CK levels back to WNL.

## 2017-09-20 NOTE — PLAN OF CARE
Problem: Goal Outcome Summary  Goal: Goal Outcome Summary  Outcome: Improving  Patient up independent. Patient saving urine. Patient stated she drinks 2 gallons of water with her bacardi. Patient drinking water today. Patient eating all meals.  Patient has her menses. Patient up independent. P;cont to monitor

## 2017-09-20 NOTE — PLAN OF CARE
Problem: Goal Outcome Summary  Goal: Goal Outcome Summary  The patient slept during the shift NS was infusing at the rate of 150 ml/hr until 0400 when it was then d'c. MSSA score was observed to be 3. Possible discharge to home based upon CK level result.

## 2017-09-20 NOTE — PROGRESS NOTES
INTERNAL MEDICINE PROGRESS NOTE    Daniela Byrne ([id number]) admitted on 9/17/2017 09/20/2017    Assessment & Plan:   Daniela Byrne is a 22 year old female with a history of alcohol and substance abuse who was admitted for possible first seizure after use of illicit drugs.    Changes today:   -- IVF: LR 250ml/hr 4 hours then maintenance  -- Hepatitis panel and HIV  -- CK tonight    # Possible tonic-clonic seizure  # Rhabdomyolysis  # Diffuse cerebral volume loss.  Patient noted that she was using LSD at a friend's house and her friends witness her having a tonic-clonic seizure. This is her first episode of seizure. Likely substance use and/or alcohol withdrawal. Drug tox positive for amphetamines and ethanol. Endorsed previously using LSD, methamphetamine, cocaine, ecstasy, and alcohol regularly. Rhabdomyolysis likely due to seizure, amphetamines; myositis less likely as asymptomatic. CT head showed diffuse cerebral volume loss.  -- MSSA protocol with lorazepam  -- CK repeat this morning and tonight  -- IVF  -- Comprehensive UDS    # Thrombocytopenia  Normal 9/17 and previously. Hb and WBC drop at same time. Therefore acute. No medication causes. Dilutional versus acute alcohol although alcohol may have presented earlier.  -- Continue to monitor    Chronic problems:  # Asthma:  -- PTA albuterol PRN    FEN: Regular diet  Prophylaxis:   Disposition: 1-2 days depending on CK levels  Code Status: Full Code      Patient was seen and discussed with Dr. NATH who agrees with above assessment and plan.      SETH Cary  Internal Medicine PGY-1  [phone]      ==================================================================    Interval Events:  No acute events overnight. Denies chest pain, SOB, or abdominal pain. Notes that she plans to live with her mother on discharge.    Objective:  Most recent vital signs:  /57 (BP Location: Right arm)  Pulse 63  Temp 98.3  F (36.8  C) (Oral)  Resp 16  Wt  57.8 kg (127 lb 8 oz)  SpO2 98%  BMI 21.22 kg/m2  Temp:  [97.7  F (36.5  C)-98.4  F (36.9  C)] 98.3  F (36.8  C)  Pulse:  [56-71] 63  Resp:  [16] 16  BP: (110-117)/(57-83) 112/57  SpO2:  [98 %-100 %] 98 %  Wt Readings from Last 2 Encounters:   09/19/17 57.8 kg (127 lb 8 oz)   04/20/17 61.7 kg (136 lb)         Physical exam:  General: Patient lying comfortably in bed, NAD  HEENT: NC/AT, EOMI, PERRL, no scleral icterus or injection, MMM  CV: RRR, normal S1 and S2, no murmurs, 2+ bilateral radial pulses  Respiratory: CTAB, no w/r/r  GI: soft, NT/ND, NABS, no guarding or rebound  Extremities: No LE edema  Skin: No acute lesions appreciated  Neuro: AOx3, CN II-XII grossly intact, no focal neurological deficits    Labs: Reviewed.

## 2017-09-21 LAB
ACETAMINOPHEN QUAL: NEGATIVE
AMOBARBITAL QUAL: NEGATIVE
ANION GAP SERPL CALCULATED.3IONS-SCNC: 7 MMOL/L (ref 3–14)
ANION GAP SERPL CALCULATED.3IONS-SCNC: 8 MMOL/L (ref 3–14)
BARBITAL QUAL: NEGATIVE
BUN SERPL-MCNC: 7 MG/DL (ref 7–30)
BUN SERPL-MCNC: 9 MG/DL (ref 7–30)
BUTABARBITAL QUAL: NEGATIVE
BUTALBITAL QUAL: NEGATIVE
CAFFEINE QUAL: NEGATIVE
CALCIUM SERPL-MCNC: 9 MG/DL (ref 8.5–10.1)
CALCIUM SERPL-MCNC: 9 MG/DL (ref 8.5–10.1)
CARBAMAZEPINE QUAL: NEGATIVE
CARISOPRODOL QUAL: NEGATIVE
CHLORIDE SERPL-SCNC: 107 MMOL/L (ref 94–109)
CHLORIDE SERPL-SCNC: 107 MMOL/L (ref 94–109)
CHLORPROPAMIDE UR-MCNC: NEGATIVE UG/ML
CK SERPL-CCNC: 6664 U/L (ref 30–225)
CK SERPL-CCNC: 7171 U/L (ref 30–225)
CK SERPL-CCNC: 8196 U/L (ref 30–225)
CK SERPL-CCNC: 8307 U/L (ref 30–225)
CO2 SERPL-SCNC: 23 MMOL/L (ref 20–32)
CO2 SERPL-SCNC: 27 MMOL/L (ref 20–32)
CREAT SERPL-MCNC: 0.53 MG/DL (ref 0.52–1.04)
CREAT SERPL-MCNC: 0.55 MG/DL (ref 0.52–1.04)
DRUGS SERPL SCN: NEGATIVE
ERYTHROCYTE [DISTWIDTH] IN BLOOD BY AUTOMATED COUNT: 13.6 % (ref 10–15)
ETHCLORVYNOL QUAL: NEGATIVE
ETHINAMATE QUAL: NEGATIVE
ETHOSUXIMIDE QUAL: NEGATIVE
ETHOTOIN QUAL: NEGATIVE
GFR SERPL CREATININE-BSD FRML MDRD: >90 ML/MIN/1.7M2
GFR SERPL CREATININE-BSD FRML MDRD: >90 ML/MIN/1.7M2
GLUCOSE SERPL-MCNC: 94 MG/DL (ref 70–99)
GLUCOSE SERPL-MCNC: 97 MG/DL (ref 70–99)
GLUTETHIMIDE QUAL: NEGATIVE
HCT VFR BLD AUTO: 34 % (ref 35–47)
HGB BLD-MCNC: 11.6 G/DL (ref 11.7–15.7)
IBUPROFEN QUAL: NEGATIVE
MAGNESIUM SERPL-MCNC: 1.9 MG/DL (ref 1.6–2.3)
MAGNESIUM SERPL-MCNC: 1.9 MG/DL (ref 1.6–2.3)
MCH RBC QN AUTO: 34.8 PG (ref 26.5–33)
MCHC RBC AUTO-ENTMCNC: 34.1 G/DL (ref 31.5–36.5)
MCV RBC AUTO: 102 FL (ref 78–100)
MEPHENYTOIN QUAL: NEGATIVE
MEPHOBARBITAL QUAL: NEGATIVE
MEPROBAMATE QUAL: NEGATIVE
METHAQUALONE QUAL: NEGATIVE
METHARBITAL QUAL: NEGATIVE
METHSUXIMIDE QUAL: NEGATIVE
METHYPRYLON QUAL: NEGATIVE
PENTOBARBITAL QUAL: NEGATIVE
PHENACETIN QUAL: NEGATIVE
PHENOBARBITAL QUAL: NEGATIVE
PHENSUXIMIDE QUAL: NEGATIVE
PHENYTOIN QUAL: NEGATIVE
PHOSPHATE SERPL-MCNC: 4.1 MG/DL (ref 2.5–4.5)
PLATELET # BLD AUTO: 123 10E9/L (ref 150–450)
POTASSIUM SERPL-SCNC: 4.1 MMOL/L (ref 3.4–5.3)
POTASSIUM SERPL-SCNC: 4.2 MMOL/L (ref 3.4–5.3)
PRIMIDONE QUAL: NEGATIVE
RBC # BLD AUTO: 3.33 10E12/L (ref 3.8–5.2)
SALICYLATE QUAL: NEGATIVE
SECOBARBITAL QUAL: NEGATIVE
SODIUM SERPL-SCNC: 138 MMOL/L (ref 133–144)
SODIUM SERPL-SCNC: 141 MMOL/L (ref 133–144)
TALBUTAL QUAL: NEGATIVE
THEOPHYLLINE QUAL: NEGATIVE
THIOPENTAL QUAL: NEGATIVE
TYBAMATE QUAL: NEGATIVE
VALPROIC ACID QUAL: NEGATIVE
WBC # BLD AUTO: 4.2 10E9/L (ref 4–11)

## 2017-09-21 PROCEDURE — 25000128 H RX IP 250 OP 636: Performed by: INTERNAL MEDICINE

## 2017-09-21 PROCEDURE — 25000132 ZZH RX MED GY IP 250 OP 250 PS 637: Performed by: INTERNAL MEDICINE

## 2017-09-21 PROCEDURE — 82550 ASSAY OF CK (CPK): CPT | Performed by: INTERNAL MEDICINE

## 2017-09-21 PROCEDURE — 12000008 ZZH R&B INTERMEDIATE UMMC

## 2017-09-21 PROCEDURE — 80048 BASIC METABOLIC PNL TOTAL CA: CPT | Performed by: INTERNAL MEDICINE

## 2017-09-21 PROCEDURE — 83735 ASSAY OF MAGNESIUM: CPT | Performed by: INTERNAL MEDICINE

## 2017-09-21 PROCEDURE — 25000128 H RX IP 250 OP 636: Performed by: STUDENT IN AN ORGANIZED HEALTH CARE EDUCATION/TRAINING PROGRAM

## 2017-09-21 PROCEDURE — 84100 ASSAY OF PHOSPHORUS: CPT | Performed by: INTERNAL MEDICINE

## 2017-09-21 PROCEDURE — 85027 COMPLETE CBC AUTOMATED: CPT | Performed by: INTERNAL MEDICINE

## 2017-09-21 PROCEDURE — 36415 COLL VENOUS BLD VENIPUNCTURE: CPT | Performed by: INTERNAL MEDICINE

## 2017-09-21 PROCEDURE — 99233 SBSQ HOSP IP/OBS HIGH 50: CPT | Mod: GC | Performed by: INTERNAL MEDICINE

## 2017-09-21 RX ORDER — SODIUM CHLORIDE, SODIUM LACTATE, POTASSIUM CHLORIDE, CALCIUM CHLORIDE 600; 310; 30; 20 MG/100ML; MG/100ML; MG/100ML; MG/100ML
INJECTION, SOLUTION INTRAVENOUS CONTINUOUS
Status: ACTIVE | OUTPATIENT
Start: 2017-09-21 | End: 2017-09-21

## 2017-09-21 RX ORDER — SODIUM CHLORIDE, SODIUM LACTATE, POTASSIUM CHLORIDE, CALCIUM CHLORIDE 600; 310; 30; 20 MG/100ML; MG/100ML; MG/100ML; MG/100ML
INJECTION, SOLUTION INTRAVENOUS CONTINUOUS
Status: DISCONTINUED | OUTPATIENT
Start: 2017-09-21 | End: 2017-09-22

## 2017-09-21 RX ORDER — SODIUM CHLORIDE, SODIUM LACTATE, POTASSIUM CHLORIDE, CALCIUM CHLORIDE 600; 310; 30; 20 MG/100ML; MG/100ML; MG/100ML; MG/100ML
INJECTION, SOLUTION INTRAVENOUS CONTINUOUS
Status: CANCELLED | OUTPATIENT
Start: 2017-09-21 | End: 2017-09-22

## 2017-09-21 RX ORDER — ACETAMINOPHEN 325 MG/1
325 TABLET ORAL EVERY 4 HOURS PRN
Status: DISCONTINUED | OUTPATIENT
Start: 2017-09-21 | End: 2017-09-24 | Stop reason: HOSPADM

## 2017-09-21 RX ORDER — SODIUM CHLORIDE, SODIUM LACTATE, POTASSIUM CHLORIDE, CALCIUM CHLORIDE 600; 310; 30; 20 MG/100ML; MG/100ML; MG/100ML; MG/100ML
INJECTION, SOLUTION INTRAVENOUS CONTINUOUS
Status: DISCONTINUED | OUTPATIENT
Start: 2017-09-21 | End: 2017-09-21

## 2017-09-21 RX ADMIN — SODIUM CHLORIDE, POTASSIUM CHLORIDE, SODIUM LACTATE AND CALCIUM CHLORIDE 1000 ML: 600; 310; 30; 20 INJECTION, SOLUTION INTRAVENOUS at 18:14

## 2017-09-21 RX ADMIN — SODIUM CHLORIDE, POTASSIUM CHLORIDE, SODIUM LACTATE AND CALCIUM CHLORIDE: 600; 310; 30; 20 INJECTION, SOLUTION INTRAVENOUS at 02:13

## 2017-09-21 RX ADMIN — SODIUM CHLORIDE, POTASSIUM CHLORIDE, SODIUM LACTATE AND CALCIUM CHLORIDE: 600; 310; 30; 20 INJECTION, SOLUTION INTRAVENOUS at 21:41

## 2017-09-21 RX ADMIN — FOLIC ACID 1 MG: 1 TABLET ORAL at 08:11

## 2017-09-21 RX ADMIN — SODIUM CHLORIDE, POTASSIUM CHLORIDE, SODIUM LACTATE AND CALCIUM CHLORIDE: 600; 310; 30; 20 INJECTION, SOLUTION INTRAVENOUS at 12:55

## 2017-09-21 RX ADMIN — MULTIPLE VITAMINS W/ MINERALS TAB 1 TABLET: TAB at 08:11

## 2017-09-21 RX ADMIN — SODIUM CHLORIDE, POTASSIUM CHLORIDE, SODIUM LACTATE AND CALCIUM CHLORIDE 1000 ML: 600; 310; 30; 20 INJECTION, SOLUTION INTRAVENOUS at 08:49

## 2017-09-21 RX ADMIN — ACETAMINOPHEN 325 MG: 325 TABLET, FILM COATED ORAL at 10:03

## 2017-09-21 NOTE — PROVIDER NOTIFICATION
Notified ria clark regarding ck level. And no seizure activity witnessed. Per vpm this RN called and no seizure like activity

## 2017-09-21 NOTE — PROGRESS NOTES
INTERNAL MEDICINE PROGRESS NOTE    Daniela Byrne ([id number]) admitted on 9/17/2017 09/21/2017    Assessment & Plan:   Daniela Byrne is a 22 year old female with a history of alcohol and substance abuse who was admitted for possible first seizure after use of illicit drugs.    Changes today:   -- IVF: LR 250mL total 4 hours then 1L/5 hours then continuous at 150mL/hr  -- VPN   -- CK this afternoon and tonight  -- Lab: LSD on urine tox?  -- Poison Control: toxin causes not otherwise considered?    # Possible tonic-clonic seizure  # Rhabdomyolysis  # Diffuse cerebral volume loss.  Patient noted that she was using LSD at a friend's house and her friends witness her having a tonic-clonic seizure. This is her first episode of seizure. Likely substance use and/or alcohol withdrawal. Drug tox positive for amphetamines and ethanol. Endorsed previously using LSD, methamphetamine, cocaine, ecstasy, and alcohol regularly. Rhabdomyolysis likely due to seizure, amphetamines; myositis less likely as asymptomatic. CK initially downtrended then came up again; uncertain whether taking drugs in room versus subtle seizure activity. Other differentials include metabolic disorders such as carnitine palmityl transferase deficiency or McArdle disease, iatrogenic (restraints and holding her down-note bruising in shape of fingers on admission-although this would have presented earlier/no second spike), infection or toxic shock although inflammatory markers WNL makes this less likely, or supplements although patient denies.  Friend brought in green tea this morning and brownies yesterday; denies any illicit substances added. If want to add LSD to urine screen, need to call [facility] [phone]. Per Poison Control, no clear explanation, may be related to initial agitation and restraints with delayed peak; they will follow-up.  -- MSSA protocol with lorazepam  -- CK repeat tonight  -- IVF  -- Comprehensive UDS (awaiting)  -- VPN    #  "Thrombocytopenia  Normal 9/17 and previously. Hb and WBC drop at same time, therefore acute. No medication causes. Dilutional versus acute alcohol although alcohol may have presented earlier. Improved today to 123.  -- Continue to monitor    Chronic problems:  # Asthma:  -- PTA albuterol PRN  # Menstrual cramps  -- PRN tylenol    FEN: Regular diet  Prophylaxis:   Disposition: 1-2 days once CK level <1500  Code Status: Full Code      Patient was seen and discussed with Dr. NATH who agrees with above assessment and plan.      SETH Cary  Internal Medicine PGY-1  [phone]      ==================================================================    Interval Events:  No acute events overnight. Says she is \"pissed off\" that her CK levels were higher yesterday. Denies taking anything not prescribed.  Her friend is going to come in later and give her some supplements and green tea powders from [facility] that she read can help with CK levels. Denies taking supplements prior to admission. Threatening to discharge AMA although able to be convinced to stay. Plans to live with her mother on discharge.  C/o some cramping from menstrual period, requested tylenol    Objective:  Most recent vital signs:  /82 (BP Location: Right arm)  Pulse 78  Temp 97  F (36.1  C) (Oral)  Resp 16  Wt 57.2 kg (126 lb 1.6 oz)  SpO2 98%  BMI 20.98 kg/m2  Temp:  [97  F (36.1  C)-99.1  F (37.3  C)] 97  F (36.1  C)  Pulse:  [68-78] 78  Resp:  [16] 16  BP: (110-120)/(62-82) 114/82  SpO2:  [98 %-99 %] 98 %  Wt Readings from Last 2 Encounters:   09/20/17 57.2 kg (126 lb 1.6 oz)   04/20/17 61.7 kg (136 lb)         Physical exam:  General: Patient lying comfortably in bed, NAD, labile mood  HEENT: NC/AT, EOMI, PERRL, no scleral icterus or injection, MMM  CV: RRR, HS I+II+0, 2+ bilateral radial pulses, no peripheral edema  Respiratory: chest clear to auscultation, normal work and rate of breathing, no fingernail clubbing  GI: soft, NT/ND, NABS, " no guarding or rebound  Extremities: No LE edema  Skin: No acute lesions appreciated  Neuro: AOx3, CN II-XII grossly intact, no focal neurological deficits    Labs: Reviewed.

## 2017-09-21 NOTE — PROGRESS NOTES
D- CK level  I/A- Staff nurse received a call from [employer]  Whom reported that the patient's CK level has trended down slightly to 8196.   P_ Staff nurse alert the medical team and continue to monitor the patient closely.

## 2017-09-21 NOTE — PLAN OF CARE
Problem: Goal Outcome Summary  Goal: Goal Outcome Summary  Outcome: Improving  AVSS. Patient c/o mentrual cramps, given tylenol. Patient up independently in room, seizure precautions maintained. Good oral intake, consuming 75% of meals. Patient had friend and mother visit. Patient declining ambulation outside of room or shower at this time, stating would like to do at a later time. IVF given as ordered. Continue to monitor CK levels and provide for patient safety.

## 2017-09-21 NOTE — PLAN OF CARE
"Problem: Goal Outcome Summary  Goal: Goal Outcome Summary  Outcome: No Change  VSS.  Pt tearful throughout the evening; expressed feeling overwhelmed \"Everyone is telling me I need to quit drinking.  I'm not going to drink the way I did but I want to go to the bar sometimes and have one.\"  Pt's mom called and RN updated her; per pt's mom, the pt expressed interest in quitting drinking and drugs.  Pt's mother suggesting to RN and pt that pt should maybe try antabuse or ativan because that seems to have curbed pt's cravings for alcohol in the past.  RN later educated pt that ativan is only indicated when pt is going through withdrawal.  Pt in bed majority of the evening.  RN encouraged pt to walk halls when feeling anxious but pt refused.  Pt noted to have large bruises on bilateral upper arms, pt thought possibly from BP cuff or prior seizure activity.  Good appetite.  Voiding adequately.  Plan for a VPM to be placed in pt's room to monitor for potential drug use and seizure activity as CK total continues to increase.  Continue to monitor per POC.      "

## 2017-09-22 LAB
ACETAMINOPHEN QUAL: NEGATIVE
AMANTADINE: NEGATIVE
AMITRIPTYLINE QUAL: NEGATIVE
AMOXAPINE: NEGATIVE
AMPHETAMINES QUAL: NEGATIVE
ANION GAP SERPL CALCULATED.3IONS-SCNC: 9 MMOL/L (ref 3–14)
ATROPINE: NEGATIVE
BENZODIAZ UR QL: NEGATIVE
BUN SERPL-MCNC: 9 MG/DL (ref 7–30)
CAFFEINE QUAL: POSITIVE
CALCIUM SERPL-MCNC: 9.4 MG/DL (ref 8.5–10.1)
CANNABINOIDS UR QL SCN: NEGATIVE
CARBAMAZEPINE QUAL: NEGATIVE
CHLORIDE SERPL-SCNC: 104 MMOL/L (ref 94–109)
CHLORPHENIRAMINE: NEGATIVE
CHLORPROMAZINE: NEGATIVE
CITALOPRAM QUAL: NEGATIVE
CK SERPL-CCNC: 6003 U/L (ref 30–225)
CLOMIPRAMINE QUAL: NEGATIVE
CO2 SERPL-SCNC: 27 MMOL/L (ref 20–32)
COCAINE QUAL: NEGATIVE
COCAINE UR QL: NEGATIVE
CODEINE QUAL: NEGATIVE
CREAT SERPL-MCNC: 0.54 MG/DL (ref 0.52–1.04)
DESIPRAMINE QUAL: NEGATIVE
DEXTROMETHORPHAN: NEGATIVE
DIPHENHYDRAMINE: NEGATIVE
DOXEPIN/METABOLITE: NEGATIVE
DOXYLAMINE: NEGATIVE
EPHEDRINE OR PSEUDO: NEGATIVE
ERYTHROCYTE [DISTWIDTH] IN BLOOD BY AUTOMATED COUNT: 13.7 % (ref 10–15)
FENTANYL QUAL: NEGATIVE
FLUOXETINE AND METAB: NEGATIVE
GFR SERPL CREATININE-BSD FRML MDRD: >90 ML/MIN/1.7M2
GLUCOSE SERPL-MCNC: 94 MG/DL (ref 70–99)
HCT VFR BLD AUTO: 34.4 % (ref 35–47)
HGB BLD-MCNC: 11.8 G/DL (ref 11.7–15.7)
HYDROCODONE QUAL: NEGATIVE
HYDROMORPHONE QUAL: NEGATIVE
IBUPROFEN QUAL: NEGATIVE
IMIPRAMINE QUAL: NEGATIVE
LAMOTRIGINE QUAL: NEGATIVE
LOXAPINE: NEGATIVE
MAPROTYLINE: NEGATIVE
MCH RBC QN AUTO: 35.2 PG (ref 26.5–33)
MCHC RBC AUTO-ENTMCNC: 34.3 G/DL (ref 31.5–36.5)
MCV RBC AUTO: 103 FL (ref 78–100)
MDMA QUAL: NEGATIVE
MEPERIDINE QUAL: NEGATIVE
METHAMPHETAMINE: NEGATIVE
METHODONE QUAL: NEGATIVE
MORPHINE QUAL: NEGATIVE
NICOTINE: NEGATIVE
NORTRIPTYLINE QUAL: NEGATIVE
OLANZAPINE QUAL: NEGATIVE
OPIATES UR QL SCN: NEGATIVE
OXYCODONE QUAL: NEGATIVE
PENTAZOCINE: NEGATIVE
PHENCYCLIDINE QUAL: NEGATIVE
PHENMETRAZINE: NEGATIVE
PHENTERMINE: NEGATIVE
PHENYLBUTAZONE: NEGATIVE
PHENYLPROPANOLAMINE: NEGATIVE
PLATELET # BLD AUTO: 146 10E9/L (ref 150–450)
POTASSIUM SERPL-SCNC: 3.6 MMOL/L (ref 3.4–5.3)
PROPOXPHENE QUAL: NEGATIVE
PROPRANOLOL QUAL: NEGATIVE
PYRILAMINE: NEGATIVE
RBC # BLD AUTO: 3.35 10E12/L (ref 3.8–5.2)
SALICYLATE QUAL: NEGATIVE
SODIUM SERPL-SCNC: 140 MMOL/L (ref 133–144)
THEOBROMINE: POSITIVE
TOPIRAMATE QUAL: NEGATIVE
TRIMIPRAMINE QUAL: NEGATIVE
VENLAFAXINE QUAL: NEGATIVE
WBC # BLD AUTO: 4.4 10E9/L (ref 4–11)

## 2017-09-22 PROCEDURE — 25000132 ZZH RX MED GY IP 250 OP 250 PS 637: Performed by: INTERNAL MEDICINE

## 2017-09-22 PROCEDURE — 80048 BASIC METABOLIC PNL TOTAL CA: CPT | Performed by: INTERNAL MEDICINE

## 2017-09-22 PROCEDURE — 25000128 H RX IP 250 OP 636: Performed by: STUDENT IN AN ORGANIZED HEALTH CARE EDUCATION/TRAINING PROGRAM

## 2017-09-22 PROCEDURE — 82550 ASSAY OF CK (CPK): CPT | Performed by: INTERNAL MEDICINE

## 2017-09-22 PROCEDURE — 25000128 H RX IP 250 OP 636: Performed by: INTERNAL MEDICINE

## 2017-09-22 PROCEDURE — 85027 COMPLETE CBC AUTOMATED: CPT | Performed by: INTERNAL MEDICINE

## 2017-09-22 PROCEDURE — 12000008 ZZH R&B INTERMEDIATE UMMC

## 2017-09-22 PROCEDURE — 40000556 ZZH STATISTIC PERIPHERAL IV START W US GUIDANCE

## 2017-09-22 PROCEDURE — 99233 SBSQ HOSP IP/OBS HIGH 50: CPT | Mod: GC | Performed by: INTERNAL MEDICINE

## 2017-09-22 PROCEDURE — 36415 COLL VENOUS BLD VENIPUNCTURE: CPT | Performed by: INTERNAL MEDICINE

## 2017-09-22 RX ORDER — SODIUM CHLORIDE, SODIUM LACTATE, POTASSIUM CHLORIDE, CALCIUM CHLORIDE 600; 310; 30; 20 MG/100ML; MG/100ML; MG/100ML; MG/100ML
INJECTION, SOLUTION INTRAVENOUS CONTINUOUS
Status: DISCONTINUED | OUTPATIENT
Start: 2017-09-22 | End: 2017-09-24

## 2017-09-22 RX ADMIN — SODIUM CHLORIDE, POTASSIUM CHLORIDE, SODIUM LACTATE AND CALCIUM CHLORIDE 1000 ML: 600; 310; 30; 20 INJECTION, SOLUTION INTRAVENOUS at 12:47

## 2017-09-22 RX ADMIN — FOLIC ACID 1 MG: 1 TABLET ORAL at 07:54

## 2017-09-22 RX ADMIN — SODIUM CHLORIDE, POTASSIUM CHLORIDE, SODIUM LACTATE AND CALCIUM CHLORIDE 1000 ML: 600; 310; 30; 20 INJECTION, SOLUTION INTRAVENOUS at 18:25

## 2017-09-22 RX ADMIN — SODIUM CHLORIDE, POTASSIUM CHLORIDE, SODIUM LACTATE AND CALCIUM CHLORIDE: 600; 310; 30; 20 INJECTION, SOLUTION INTRAVENOUS at 21:58

## 2017-09-22 RX ADMIN — MULTIPLE VITAMINS W/ MINERALS TAB 1 TABLET: TAB at 07:54

## 2017-09-22 RX ADMIN — SODIUM CHLORIDE, POTASSIUM CHLORIDE, SODIUM LACTATE AND CALCIUM CHLORIDE 1000 ML: 600; 310; 30; 20 INJECTION, SOLUTION INTRAVENOUS at 10:40

## 2017-09-22 RX ADMIN — SODIUM CHLORIDE, POTASSIUM CHLORIDE, SODIUM LACTATE AND CALCIUM CHLORIDE: 600; 310; 30; 20 INJECTION, SOLUTION INTRAVENOUS at 00:59

## 2017-09-22 NOTE — PLAN OF CARE
Problem: Goal Outcome Summary  Goal: Goal Outcome Summary  Outcome: Improving  Care continued. IVF site leaking at start of the shift and new PIV was placed  using US after 3 failed insertions were done. Tolerated procedure well and IV  LR bolus restarted. Now on last bolus order before patient will receive LR maintenance at 250 ml/hr rate overnight. CK trending down and level will be drawn in AM. Good appetite this shift. No other calls made.  P: Continue to administer LR bolus and monitor CK levels.

## 2017-09-22 NOTE — PROGRESS NOTES
"  INTERNAL MEDICINE PROGRESS NOTE    Daniela Byrne ([id number]) admitted on 9/17/2017 09/22/2017    Assessment & Plan:   Daniela Byrne is a 22 year old female with a history of alcohol and substance abuse who was admitted for possible first seizure after use of illicit drugs.    Changes today:   -- IVF: several boluses in for today (1L/2h, 1L/3 hr, 1L/4 hours-continuously on 250 after)  -- VPN   -- CK next check tomorrow morning    # Possible tonic-clonic seizure  # Rhabdomyolysis  # Diffuse cerebral volume loss.  First episode of seizure. Likely substance use and/or alcohol withdrawal. Drug tox positive for amphetamines and ethanol. Rhabdomyolysis likely due to seizure, restraints/being held down  -- MSSA protocol with lorazepam  -- CK repeat tomorrow morning  -- IVF  -- Comprehensive UDS (awaiting)  -- VPN    # Thrombocytopenia  Normal 9/17 and previously. Likely dilutional, now near normal at 146.  -- Continue to monitor    Chronic problems:  # Asthma:  -- PTA albuterol PRN  # Menstrual cramps  -- PRN tylenol    FEN: Regular diet  Prophylaxis:   Disposition: 1-2 days once CK level <1500  Code Status: Full Code      Patient was seen and discussed with Dr. NATH who agrees with above assessment and plan.      SETH Cary  Internal Medicine PGY-1  [phone]      ==================================================================    Interval Events:  No acute events overnight. Refused blood draw this morning as she saw \"3pm and 10pm blood draws\" on board from yesterday. Patient consented to have her morning blood draw.      Objective:  Most recent vital signs:  /82 (BP Location: Right arm)  Pulse 76  Temp 97.8  F (36.6  C) (Oral)  Resp 16  Wt 58.2 kg (128 lb 4.8 oz)  SpO2 98%  BMI 21.35 kg/m2  Temp:  [97.8  F (36.6  C)-99.2  F (37.3  C)] 97.8  F (36.6  C)  Pulse:  [70-80] 76  Resp:  [16-18] 16  BP: (118-128)/(82-85) 118/82  SpO2:  [96 %-100 %] 98 %  Wt Readings from Last 2 Encounters: "   09/21/17 58.2 kg (128 lb 4.8 oz)   04/20/17 61.7 kg (136 lb)         Physical exam:  General: Patient lying comfortably in bed, NAD, easily roused  HEENT: NC/AT, EOMI, PERRL, no scleral icterus or injection, MMM  CV: RRR, HS I+II+0, 2+ bilateral radial pulses, no peripheral edema  Respiratory: chest clear to auscultation, normal work and rate of breathing, no fingernail clubbing  GI: soft, NT/ND, NABS, no guarding or rebound  Extremities: No LE edema  Skin: No acute lesions appreciated  Neuro: AOx3, CN II-XII grossly intact, no focal neurological deficits    Labs: Reviewed.

## 2017-09-22 NOTE — PLAN OF CARE
Problem: Goal Outcome Summary  Goal: Goal Outcome Summary  Outcome: Improving  Patient alert and oriented, pleasant and cooperative with cares. Independent with ADLs. No c/o pain or discomfort and no respiratory issues noted. Seen and rounded by primary team and LR bolus orders placed. CK total improving. Ate 100% of meals and had good appetite. Resting as of this time. Took a shower earlier. P: Continue to administer LR boluses and monitor CK results.

## 2017-09-22 NOTE — PLAN OF CARE
Problem: Goal Outcome Summary  Goal: Goal Outcome Summary  Patient ck elevated from this am. Another fluid bolus given. Drinking fluids and tolerating diet. Patient has rodriguez. Patient up independent in room. P:cont to monitor

## 2017-09-22 NOTE — PLAN OF CARE
Problem: Goal Outcome Summary  Goal: Goal Outcome Summary  Patient slept during the shift without issue CK level currently is 7171 which was last drawn at 2200. Currently the patient has LR infusing at the rate of 150 ml/hr.  attempted to draw the 0600 CK level as ordered,but the patient explained Dr. Reece stated that if her CK level was decreasing that she could wait until 1500 for the next draw. Continue to monitor the patient and follow the POC.

## 2017-09-23 LAB
ANION GAP SERPL CALCULATED.3IONS-SCNC: 6 MMOL/L (ref 3–14)
BUN SERPL-MCNC: 7 MG/DL (ref 7–30)
CALCIUM SERPL-MCNC: 9.2 MG/DL (ref 8.5–10.1)
CHLORIDE SERPL-SCNC: 106 MMOL/L (ref 94–109)
CK SERPL-CCNC: 2690 U/L (ref 30–225)
CO2 SERPL-SCNC: 28 MMOL/L (ref 20–32)
CREAT SERPL-MCNC: 0.6 MG/DL (ref 0.52–1.04)
ERYTHROCYTE [DISTWIDTH] IN BLOOD BY AUTOMATED COUNT: 14.1 % (ref 10–15)
GFR SERPL CREATININE-BSD FRML MDRD: >90 ML/MIN/1.7M2
GLUCOSE SERPL-MCNC: 85 MG/DL (ref 70–99)
HCT VFR BLD AUTO: 33.7 % (ref 35–47)
HGB BLD-MCNC: 11.1 G/DL (ref 11.7–15.7)
MCH RBC QN AUTO: 34.4 PG (ref 26.5–33)
MCHC RBC AUTO-ENTMCNC: 32.9 G/DL (ref 31.5–36.5)
MCV RBC AUTO: 104 FL (ref 78–100)
PLATELET # BLD AUTO: 138 10E9/L (ref 150–450)
POTASSIUM SERPL-SCNC: 4 MMOL/L (ref 3.4–5.3)
RBC # BLD AUTO: 3.23 10E12/L (ref 3.8–5.2)
SODIUM SERPL-SCNC: 141 MMOL/L (ref 133–144)
WBC # BLD AUTO: 4.6 10E9/L (ref 4–11)

## 2017-09-23 PROCEDURE — 25000128 H RX IP 250 OP 636: Performed by: INTERNAL MEDICINE

## 2017-09-23 PROCEDURE — 99233 SBSQ HOSP IP/OBS HIGH 50: CPT | Mod: GC | Performed by: INTERNAL MEDICINE

## 2017-09-23 PROCEDURE — 80048 BASIC METABOLIC PNL TOTAL CA: CPT | Performed by: INTERNAL MEDICINE

## 2017-09-23 PROCEDURE — 36415 COLL VENOUS BLD VENIPUNCTURE: CPT | Performed by: INTERNAL MEDICINE

## 2017-09-23 PROCEDURE — 12000008 ZZH R&B INTERMEDIATE UMMC

## 2017-09-23 PROCEDURE — 25000132 ZZH RX MED GY IP 250 OP 250 PS 637: Performed by: INTERNAL MEDICINE

## 2017-09-23 PROCEDURE — 85027 COMPLETE CBC AUTOMATED: CPT | Performed by: INTERNAL MEDICINE

## 2017-09-23 PROCEDURE — 82550 ASSAY OF CK (CPK): CPT | Performed by: INTERNAL MEDICINE

## 2017-09-23 RX ADMIN — SODIUM CHLORIDE, POTASSIUM CHLORIDE, SODIUM LACTATE AND CALCIUM CHLORIDE 1000 ML: 600; 310; 30; 20 INJECTION, SOLUTION INTRAVENOUS at 15:22

## 2017-09-23 RX ADMIN — MULTIPLE VITAMINS W/ MINERALS TAB 1 TABLET: TAB at 09:40

## 2017-09-23 RX ADMIN — SODIUM CHLORIDE, POTASSIUM CHLORIDE, SODIUM LACTATE AND CALCIUM CHLORIDE 1000 ML: 600; 310; 30; 20 INJECTION, SOLUTION INTRAVENOUS at 21:19

## 2017-09-23 RX ADMIN — SODIUM CHLORIDE, POTASSIUM CHLORIDE, SODIUM LACTATE AND CALCIUM CHLORIDE 1000 ML: 600; 310; 30; 20 INJECTION, SOLUTION INTRAVENOUS at 19:23

## 2017-09-23 RX ADMIN — SODIUM CHLORIDE, POTASSIUM CHLORIDE, SODIUM LACTATE AND CALCIUM CHLORIDE: 600; 310; 30; 20 INJECTION, SOLUTION INTRAVENOUS at 13:42

## 2017-09-23 RX ADMIN — ACETAMINOPHEN 325 MG: 325 TABLET, FILM COATED ORAL at 13:43

## 2017-09-23 RX ADMIN — FOLIC ACID 1 MG: 1 TABLET ORAL at 09:40

## 2017-09-23 RX ADMIN — SODIUM CHLORIDE, POTASSIUM CHLORIDE, SODIUM LACTATE AND CALCIUM CHLORIDE 1000 ML: 600; 310; 30; 20 INJECTION, SOLUTION INTRAVENOUS at 23:22

## 2017-09-23 NOTE — PLAN OF CARE
Problem: Goal Outcome Summary  Goal: Goal Outcome Summary  Outcome: Improving  Patient condition improving. Still with ongoing IV LR bolus at 250 ml/hr via PIV. Sitting on bed as of this time talking with visitors and reported that she ordered supper already. Claimed feeling much better compared to previous days. CK trending down.  P: continue to administer LR boluses as ordered. Monitor CK levels.

## 2017-09-23 NOTE — PLAN OF CARE
Assumed cares at 2300. A/o x 4. Up IND. R PIV infusing  ml/hr.Pt has received a total of 4L of LR this past 24 hours.  Voiding adequately. Denies pain. CK recheck in AM. Chem Dep and Psych consult. P: Cont with POC. Goal is to have CK <1500. Recheck in AM. Call light within reach. Will continue to monitor.

## 2017-09-23 NOTE — PLAN OF CARE
Problem: Goal Outcome Summary  Goal: Goal Outcome Summary  Outcome: No Change  VSS.  A/Ox4.  Up ad renée independently.  Uneventful day.  Tylenol given for headache x1. LR infusing at 250 mL/hr.  Voiding adequately.  Plan to d/c once CK <1500.  Pt in good spirits and looking forward to discharge.  Continue to monitor per POC.

## 2017-09-24 VITALS
SYSTOLIC BLOOD PRESSURE: 124 MMHG | DIASTOLIC BLOOD PRESSURE: 84 MMHG | TEMPERATURE: 97 F | RESPIRATION RATE: 16 BRPM | BODY MASS INDEX: 22.37 KG/M2 | OXYGEN SATURATION: 100 % | HEART RATE: 71 BPM | WEIGHT: 134.4 LBS

## 2017-09-24 LAB
ANION GAP SERPL CALCULATED.3IONS-SCNC: 6 MMOL/L (ref 3–14)
BUN SERPL-MCNC: 9 MG/DL (ref 7–30)
CALCIUM SERPL-MCNC: 9.3 MG/DL (ref 8.5–10.1)
CHLORIDE SERPL-SCNC: 106 MMOL/L (ref 94–109)
CK SERPL-CCNC: 1181 U/L (ref 30–225)
CO2 SERPL-SCNC: 28 MMOL/L (ref 20–32)
CREAT SERPL-MCNC: 0.61 MG/DL (ref 0.52–1.04)
ERYTHROCYTE [DISTWIDTH] IN BLOOD BY AUTOMATED COUNT: 14.1 % (ref 10–15)
GFR SERPL CREATININE-BSD FRML MDRD: >90 ML/MIN/1.7M2
GLUCOSE SERPL-MCNC: 84 MG/DL (ref 70–99)
HCT VFR BLD AUTO: 33.6 % (ref 35–47)
HGB BLD-MCNC: 11 G/DL (ref 11.7–15.7)
MCH RBC QN AUTO: 34.4 PG (ref 26.5–33)
MCHC RBC AUTO-ENTMCNC: 32.7 G/DL (ref 31.5–36.5)
MCV RBC AUTO: 105 FL (ref 78–100)
PLATELET # BLD AUTO: 144 10E9/L (ref 150–450)
POTASSIUM SERPL-SCNC: 3.8 MMOL/L (ref 3.4–5.3)
RBC # BLD AUTO: 3.2 10E12/L (ref 3.8–5.2)
SODIUM SERPL-SCNC: 140 MMOL/L (ref 133–144)
WBC # BLD AUTO: 5.1 10E9/L (ref 4–11)

## 2017-09-24 PROCEDURE — 82550 ASSAY OF CK (CPK): CPT | Performed by: INTERNAL MEDICINE

## 2017-09-24 PROCEDURE — 25000132 ZZH RX MED GY IP 250 OP 250 PS 637: Performed by: INTERNAL MEDICINE

## 2017-09-24 PROCEDURE — 85027 COMPLETE CBC AUTOMATED: CPT | Performed by: INTERNAL MEDICINE

## 2017-09-24 PROCEDURE — 80048 BASIC METABOLIC PNL TOTAL CA: CPT | Performed by: INTERNAL MEDICINE

## 2017-09-24 PROCEDURE — 99238 HOSP IP/OBS DSCHRG MGMT 30/<: CPT | Mod: GC | Performed by: INTERNAL MEDICINE

## 2017-09-24 PROCEDURE — 36415 COLL VENOUS BLD VENIPUNCTURE: CPT | Performed by: INTERNAL MEDICINE

## 2017-09-24 RX ADMIN — FOLIC ACID 1 MG: 1 TABLET ORAL at 08:47

## 2017-09-24 RX ADMIN — MULTIPLE VITAMINS W/ MINERALS TAB 1 TABLET: TAB at 08:47

## 2017-09-24 NOTE — DISCHARGE SUMMARY
"                                                                 Medicine Discharge Summary  Sandra Montgomery MRN: [id number]  1995  Primary care provider: Larisa Espinal  ___________________________________          Date of Admission:  9/17/2017  Date of Discharge:  9/24/2017   Admitting Physician:  Jeff Molina MD  Discharge Physician:  Shruthi Parada MD   Discharging Service:  Internal Medicine, [place] [address]     Primary Provider: Larisa Espinal         For PCP/Outpatient Follow-up:   Follow-up with PCP within 1 week for repeat CK.         Reason for Admission:   Seizures          Discharge Diagnosis:   Possible tonic-clonic seizure  Rhabdomyolysis  Diffuse cerebral volume loss  Thrombocytopenia - improving    Secondary Diagnoses  Asthma  Menstrual cramps         Procedures & Significant Findings:     Results for orders placed or performed during the hospital encounter of 09/17/17   Head CT w/o contrast    Addendum: 9/19/2017    SANDRA MONTGOMERY  Accession # [id number]    The original report on this patient was dictated by Dr. Calle.    ADDENDUM: The first sentence in the findings section should read:  \"There is mild diffuse cerebral volume loss.\" The second sentence of  the findings section should be deleted. Likewise, the impression  should read: \"Mild diffuse cerebral volume loss. No evidence for acute  intracranial pathology.\"    Sixto Calle MD ( Date of Addendum: 9/19/2017 3:29 PM)    SIXTO CALLE MD      Narrative    CT OF THE HEAD WITHOUT CONTRAST 9/17/2017 10:41 PM     COMPARISON: None.    HISTORY: Seizure.    TECHNIQUE: 5 mm thick axial CT images of the head were acquired  without IV contrast material.    FINDINGS: There is mild diffuse cerebral volume loss. There are subtle  patchy areas of decreased density in the cerebral white matter  bilaterally that are consistent with sequela of chronic small vessel  ischemic disease.    The ventricles and basal cisterns are within normal limits " in  configuration given the degree of cerebral volume loss.  There is no  midline shift. There are no extra-axial fluid collections.    No intracranial hemorrhage, mass or recent infarct.    The visualized paranasal sinuses are well-aerated. There is no  mastoiditis. There are no fractures of the visualized bones.      Impression    IMPRESSION: Diffuse cerebral volume loss and cerebral white matter  changes consistent with chronic small vessel ischemic disease. No  evidence for acute intracranial pathology.    Radiation dose for this scan was reduced using automated exposure  control, adjustment of the mA and/or kV according to patient size, or  iterative reconstruction technique.     SIXTO MONTELONGO MD   Chest XR,  PA & LAT    Narrative    CHEST TWO VIEWS 9/17/2017 10:48 PM     HISTORY: Fever post seizure.    COMPARISON: None.    FINDINGS: There are no acute infiltrates. The cardiac silhouette is  not enlarged. Pulmonary vasculature is unremarkable.      Impression    IMPRESSION: No acute disease.     ALLI REDDY MD            Consultations:   Psychiatry         History of Present Illness:    Daniela Byrne is a 22 year old female who has a history of polysubstance abuse and is admitted for witnessed seizure activity.    See H&P from 9/18/17 for additional information.         Hospital Course by Problem:      Possible tonic-clonic seizure  Rhabdomyolysis  Diffuse cerebral volume loss  Patient has no history of prior seizures. No seizures were noted while patient was hospitalized. The episode of seizure prior to admission possibly a tonic-clonic seizure due to substance use and/or alcohol withdrawal. Drug tox positive for amphetamines and ethanol. Patient endorsed using LSD prior to admission. Patient was on MSSA protocol with Ativan during initial part of hospitalization. CT head showed diffuse cerebral volume loss likely due to chronic polysubstance use. Rhabdomyolysis likely due to seizure and use of  restraints by EMS (patient had ecchymoses on bilateral upper extremities). CK peaked at 8581 on 9/20 and was 1181 on day of discharge after IVF and had no increase in Cr. Patient was advised to continue abundant PO fluid intake on discharge.  -- Follow-up with PCP within 1 week for repeat CK    Thrombocytopenia - improving  Platelet count alfie to 80 on 9/19. Platelet count 144 on day of discharge. Likely dilutional due to IVF vs possibly due to chronic polysubstance use.    Asthma  Continued PTA albuterol PRN.    Menstrual cramps  PRN APAP.      Physical Exam on day of Discharge:  Blood pressure 124/84, pulse 71, temperature 97  F (36.1  C), temperature source Oral, resp. rate 16, weight 61 kg (134 lb 6.4 oz), SpO2 100 %, not currently breastfeeding.  General: alert, oriented, sitting upright in bed, NAD  HEENT: NC/AT  Respiratory: CTAB, no w/r/r, on RA  Heart/CV: RRR, normal S1 and S2, no murmurs  Abdomen/GI: soft, NTND, bowel sounds present, no masses  Extremities/MSK: no LE edema  Neuro: no focal neurological deficits    Lines/Tubes: None           Pending Results:   None          Discharge Medications:     Discharge Medication List as of 9/24/2017 11:33 AM      CONTINUE these medications which have NOT CHANGED    Details   medroxyPROGESTERone (DEPO-PROVERA) 150 MG/ML injection Inject 150 mg into the muscle every 3 months Reported on 3/22/2017, Historical      albuterol (PROAIR HFA, PROVENTIL HFA, VENTOLIN HFA) 108 (90 BASE) MCG/ACT inhaler Inhale 2 puffs into the lungs every 4 hours as needed for shortness of breath / dyspnea, Disp-1 Inhaler, R-5, E-Prescribe                  Discharge Instructions and Follow-Up:     Discharge Procedure Orders  Reason for your hospital stay   Order Comments: You were admitted for a seizure related to drug intake/alcohol withdrawal.  You were placed on alcohol withdrawal protocol, had no further seizures, and were discharged.  You did not need to see neurology or start  anti-seizure medications.     Activity   Order Comments: Your activity upon discharge: activity as tolerated   Order Specific Question Answer Comments   Is discharge order? Yes      Reason for your hospital stay   Order Comments: You were admitted after a seizure and treated for muscle breakdown (rhabdomyolysis) with lots of IV fluids. Your kidney numbers remained normal.     Adult [facility] Follow-up and recommended labs and tests   Order Comments: Follow up with primary care provider, Larisa Espinal, within 7 days for hospital follow- up.  The following labs/tests are recommended: CK.      Appointments on [facility] and/or [facility] (with [facility] or [facility] provider or service). Call [phone] if you haven't heard regarding these appointments within 7 days of discharge.     Activity   Order Comments: Your activity upon discharge: activity as tolerated   Order Specific Question Answer Comments   Is discharge order? Yes      Discharge Instructions   Order Comments: Please get over the counter vitamins: Vitamin B1 (thiamine) and folic acid. Alternatively can take a multivitamin with these in it.     Full Code     Full Code     Diet   Order Comments: Follow this diet upon discharge: Orders Placed This Encounter     Regular Diet Adult   Order Specific Question Answer Comments   Is discharge order? Yes      Diet   Order Comments: Follow this diet upon discharge: Orders Placed This Encounter     Room Service     Regular Diet Adult     Diet   Order Specific Question Answer Comments   Is discharge order? Yes                Discharge Disposition:   Home with mother         Condition on Discharge:   Discharge condition: Good   Code status on discharge: Full Code        Date of service: 9/24/2017    The patient was discussed with Dr. Parada.    Brenda Geiger MD, PhD  PGY-2 Internal Medicine  Pager: [phone]

## 2017-09-24 NOTE — PROGRESS NOTES
Pt discharged back to home accompanied by pt's mom at 1330.  All belongings sent with pt ex. Phone  (phone call made to pt).  PIV removed.  Discharge instructions and medications reviewed with no further questions.

## 2017-09-24 NOTE — PROGRESS NOTES
INTERNAL MEDICINE PROGRESS NOTE    Daniela Byrne ([id number]) admitted on 9/17/2017 09/23/2017    Assessment & Plan:   Daniela Byrne is a 22 year old female with a history of alcohol and substance abuse who was admitted for possible first seizure after use of illicit drugs.    Changes today:   -- IVF: 5L of boluses each over 2 hours plus 250mL/hr maintenance when boluses not running  -- VPN: discontinue  -- CK next check tomorrow morning; discharge when <1500    # Possible tonic-clonic seizure  # Rhabdomyolysis  # Diffuse cerebral volume loss.  First episode of seizure. Likely substance use and/or alcohol withdrawal. Drug tox positive for amphetamines and ethanol. Rhabdomyolysis likely due to seizure, restraints/being held down  -- MSSA protocol with lorazepam  -- CK repeat tomorrow morning  -- IVF  -- Comprehensive UDS (awaiting)  -- VPN: discontinue    # Thrombocytopenia  Normal 9/17 and previously. Likely dilutional, now near normal  -- Continue to monitor    Chronic problems:  # Asthma:  -- PTA albuterol PRN  # Menstrual cramps  -- PRN tylenol    FEN: Regular diet  Prophylaxis:   Disposition: Tomorrow if CK level <1500  Code Status: Full Code      Patient was seen and discussed with Dr. NATH who agrees with above assessment and plan.      SETH Cary  Internal Medicine PGY-1  [phone]      ==================================================================    Interval Events:  No acute events overnight. Ms. Byrne is her normal pleasant self.      Objective:  Most recent vital signs:  /66 (BP Location: Right arm)  Pulse 71  Temp 97.7  F (36.5  C) (Oral)  Resp 16  Wt 61.7 kg (136 lb 0.4 oz)  SpO2 100%  BMI 22.64 kg/m2  Temp:  [97.7  F (36.5  C)-98.5  F (36.9  C)] 97.7  F (36.5  C)  Pulse:  [67-71] 71  Heart Rate:  [75] 75  Resp:  [16] 16  BP: (115-126)/(66-92) 120/66  SpO2:  [97 %-100 %] 100 %  Wt Readings from Last 2 Encounters:   09/22/17 61.7 kg (136 lb 0.4 oz)   04/20/17 61.7  kg (136 lb)         Physical exam:  General: Patient lying comfortably in bed, NAD  HEENT: NC/AT, EOMI, PERRL, no scleral icterus or injection, MMM  CV: RRR, HS I+II+0, 2+ bilateral radial pulses, no peripheral edema  Respiratory: chest clear to auscultation, normal work and rate of breathing, no fingernail clubbing  GI: soft, NT/ND, NABS, no guarding or rebound  Extremities: No LE edema  Skin: No acute lesions appreciated  Neuro: AOx3, CN II-XII grossly intact, no focal neurological deficits    Labs: Reviewed.

## 2017-09-24 NOTE — PLAN OF CARE
Assumed cares at [phone]. A/o x 4. Up IND. Denies pain. 1000 cc LR bolus given x 3 during shift per order. Currently LR at 250 ml/hr infusing per R PIV. VPM D/c'd during shift d/t down trending CK per MD (9796). Boyfriend slept in room with pt during night.  P: Cont with POC. Poss D/c today- pt hoping to be able to leave today. CK goal <1500 prior to D/c. Call light within reach. Will continue to monitor.       Addendum: Cont LR infusion D/c'd this AM. Will inform oncoming nurse.

## 2017-09-25 ENCOUNTER — TELEPHONE (OUTPATIENT)
Dept: FAMILY MEDICINE | Facility: CLINIC | Age: 22
End: 2017-09-25

## 2017-09-25 NOTE — TELEPHONE ENCOUNTER
"ED/Discharge Protocol    \"Hi, my name is Magdalena Nieto, a registered nurse, and I am calling on behalf of Larisa Espinal' office at [place].  I am calling to follow up and see how things are going for you after your recent visit.\"    \"I see that you were in the (ER/UC/IP) on 9-17-17.    How are you doing now that you are home?\" \"Doing good. Staying hydrated.\"    Is patient experiencing symptoms that may require a hospital visit?  No    Discharge Instructions    \"Let's review your discharge instructions.  What is/are the follow-up recommendations?  Pt. Response: F/U with PCP in one week.    \"Were you instructed to make a follow-up appointment?\"  Pt. Response: Yes.  Has appointment been made?   No.  \"Can I help you schedule that appointment?\" Yes      \"When you see the provider, I would recommend that you bring your discharge instructions with you.    Medications    \"How many new medications are you on since your hospitalization/ED visit?\"    0-1  \"How many of your current medicines changed (dose, timing, name, etc.) while you were in the hospital/ED visit?\"   0-1  \"Do you have questions about your medications?\"   No  \"Were you newly diagnosed with heart failure, COPD, diabetes or did you have a heart attack?\"   No  For patients on insulin: \"Did you start on insulin in the hospital or did you have your insulin dose changed?\"   No    Medication reconciliation completed? Yes    Was MTM referral placed (*Make sure to put transitions as reason for referral)?   No    Call Summary    \"Do you have any questions or concerns about your condition or care plan at the moment?\"    No  Triage nurse advice given: Call with questions.    Patient was in ER 0 in the past year (assess appropriateness of ER visits.)      \"If you have questions or things don't continue to improve, we encourage you contact us through the main clinic number,  [phone].  Even if the clinic is not open, triage nurses are available 24/7 to help you. " "    We would like you to know that our clinic has extended hours (provide information).  We also have urgent care (provide details on closest location and hours/contact info)\"      \"Thank you for your time and take care!\"      Magdalena MURCIA RN    "

## 2017-09-25 NOTE — TELEPHONE ENCOUNTER
ED / Discharge Outreach Protocol    Patient Contact    Attempt # 1    Was call answered?  No.  Left message on voicemail with information to call me back.    F/U in one week with PCP.    Magdalena MURCIA RN

## 2017-09-29 ENCOUNTER — TELEPHONE (OUTPATIENT)
Dept: FAMILY MEDICINE | Facility: CLINIC | Age: 22
End: 2017-09-29

## 2017-10-04 ENCOUNTER — OFFICE VISIT (OUTPATIENT)
Dept: FAMILY MEDICINE | Facility: CLINIC | Age: 22
End: 2017-10-04
Payer: COMMERCIAL

## 2017-10-04 VITALS
BODY MASS INDEX: 20.99 KG/M2 | DIASTOLIC BLOOD PRESSURE: 54 MMHG | SYSTOLIC BLOOD PRESSURE: 99 MMHG | HEIGHT: 65 IN | HEART RATE: 84 BPM | TEMPERATURE: 98.4 F | WEIGHT: 126 LBS

## 2017-10-04 DIAGNOSIS — J45.30 MILD PERSISTENT ASTHMA, UNCOMPLICATED: ICD-10-CM

## 2017-10-04 DIAGNOSIS — F19.90 SUBSTANCE USE DISORDER: ICD-10-CM

## 2017-10-04 DIAGNOSIS — J30.2 ACUTE SEASONAL ALLERGIC RHINITIS, UNSPECIFIED TRIGGER: ICD-10-CM

## 2017-10-04 DIAGNOSIS — F41.9 ANXIETY: Primary | ICD-10-CM

## 2017-10-04 DIAGNOSIS — R56.9 SEIZURE (H): ICD-10-CM

## 2017-10-04 LAB — CK SERPL-CCNC: 148 U/L (ref 30–225)

## 2017-10-04 PROCEDURE — 82550 ASSAY OF CK (CPK): CPT | Performed by: NURSE PRACTITIONER

## 2017-10-04 PROCEDURE — 99215 OFFICE O/P EST HI 40 MIN: CPT | Performed by: NURSE PRACTITIONER

## 2017-10-04 PROCEDURE — 36415 COLL VENOUS BLD VENIPUNCTURE: CPT | Performed by: NURSE PRACTITIONER

## 2017-10-04 RX ORDER — ESCITALOPRAM OXALATE 5 MG/1
5 TABLET ORAL DAILY
Qty: 30 TABLET | Refills: 0 | Status: SHIPPED | OUTPATIENT
Start: 2017-10-04 | End: 2018-02-09

## 2017-10-04 RX ORDER — ALBUTEROL SULFATE 90 UG/1
2 AEROSOL, METERED RESPIRATORY (INHALATION) EVERY 4 HOURS PRN
Qty: 1 INHALER | Refills: 5 | Status: SHIPPED | OUTPATIENT
Start: 2017-10-04 | End: 2020-01-06

## 2017-10-04 NOTE — PROGRESS NOTES
"  SUBJECTIVE:   Daniela Byrne is a 22 year old female who presents to clinic today for the following health issues:        Hospital Follow-up Visit:    Hospital/Nursing Home/IP Rehab Facility: [facility]   Date of Admission: 9/17/17  Date of Discharge: 9/24/17  Reason(s) for Admission: Siezures, alcohol and LSD use.  Mom witnessed seizure activity at home and 1-2 prior to mom arrival.  Friend witnessed prior seizure.    Has appointment with Probation today at 2 pm.  Has not looked into any CD treatments or resources at this point.  Declines inpatient treatment - as it \"didn't work\"  Currently living with mom and reports has been sober for the past week.            Problems taking medications regularly:  None       Medication changes since discharge: None       Problems adhering to non-medication therapy:  None     Summary of hospitalization:  [facility] discharge summary reviewed  Diagnostic Tests/Treatments reviewed.  Follow up needed: CD evaluation - CK recheck.  Other Healthcare Providers Involved in Patient s Care:         None  Update since discharge: improved.      Post Discharge Medication Reconciliation: discharge medications reconciled and changed, per note/orders (see AVS).  Plan of care communicated with patient and family     Coding guidelines for this visit:  Type of Medical   Decision Making Face-to-Face Visit       within 7 Days of discharge Face-to-Face Visit        within 14 days of discharge   Moderate Complexity [id number]   High Complexity [id number]              Anxiety Follow-Up    Status since last visit: Worsened substance use - and recent hospitalization.    Other associated symptoms:None    Complicating factors:   Significant life event: Yes-  Legal issues.  Current substance abuse: Alcohol and Amphetamines  Depression symptoms: No  No flowsheet data found.    GAD7                  Problem list and histories reviewed & adjusted, as " indicated.  Additional history: as documented    Patient Active Problem List   Diagnosis     Health Care Home     HSV-1 infection     Abnormal Pap smear of cervix     Genital herpes     CARDIOVASCULAR SCREENING; LDL GOAL LESS THAN 160     Mild persistent asthma, uncomplicated     Encounter for surveillance of injectable contraceptive     Tobacco use disorder     Seizure (H)     Anxiety     Acute seasonal allergic rhinitis, unspecified trigger     Past Surgical History:   Procedure Laterality Date     NO HISTORY OF SURGERY  8/2011       Social History   Substance Use Topics     Smoking status: Current Every Day Smoker     Packs/day: 0.50     Years: 8.00     Types: Cigarettes     Smokeless tobacco: Never Used     Alcohol use 4.2 oz/week     7 Standard drinks or equivalent per week      Comment: Completed STOP and DUAL alcohol tx programs.      Family History   Problem Relation Age of Onset     Alcohol/Drug Father      ETOH     Depression Father      CANCER Maternal Grandmother      larynx         Current Outpatient Prescriptions   Medication Sig Dispense Refill     albuterol (PROAIR HFA/PROVENTIL HFA/VENTOLIN HFA) 108 (90 BASE) MCG/ACT Inhaler Inhale 2 puffs into the lungs every 4 hours as needed for shortness of breath / dyspnea 1 Inhaler 5     escitalopram (LEXAPRO) 5 MG tablet Take 1 tablet (5 mg) by mouth daily 30 tablet 0     medroxyPROGESTERone (DEPO-PROVERA) 150 MG/ML injection Inject 150 mg into the muscle every 3 months Reported on 3/22/2017       [DISCONTINUED] albuterol (PROAIR HFA, PROVENTIL HFA, VENTOLIN HFA) 108 (90 BASE) MCG/ACT inhaler Inhale 2 puffs into the lungs every 4 hours as needed for shortness of breath / dyspnea 1 Inhaler 5     Allergies   Allergen Reactions     Bactrim [Sulfamethoxazole W/Trimethoprim] Rash     Recent Labs   Lab Test  09/24/17   0752  09/23/17   0700   09/18/17   0626  09/17/17   2125  02/28/13   0249  11/17/09   0635   ALT   --    --    --   42  46  22  <6   CR  0.61  0.60   " < >  0.54  0.60  0.72  0.80   GFRESTIMATED  >90  >90   < >  >90  >90  >90  >90   GFRESTBLACK  >90  >90   < >  >90  >90  >90  >90   POTASSIUM  3.8  4.0   < >  3.9  3.9  3.5  4.2   TSH   --    --    --    --   0.53   --   1.61    < > = values in this interval not displayed.      BP Readings from Last 3 Encounters:   10/04/17 99/54   09/24/17 124/84   04/20/17 104/67    Wt Readings from Last 3 Encounters:   10/04/17 126 lb (57.2 kg)   09/23/17 134 lb 6.4 oz (61 kg)   04/20/17 136 lb (61.7 kg)                  Labs reviewed in EPIC          Reviewed and updated as needed this visit by clinical staffAllergies       Reviewed and updated as needed this visit by Provider         ROS:  Constitutional, HEENT, cardiovascular, pulmonary, GI, , musculoskeletal, neuro, skin, endocrine and psych systems are negative, except as otherwise noted.      OBJECTIVE:   BP 99/54  Pulse 84  Temp 98.4  F (36.9  C) (Tympanic)  Ht 5' 5\" (1.651 m)  Wt 126 lb (57.2 kg)  Breastfeeding? No  BMI 20.97 kg/m2  Body mass index is 20.97 kg/(m^2).  GENERAL: healthy, alert and no distress  PSYCH: mentation appears normal, affect flat and poor eye contact - looking at ground.  Here with mom accompanying patient.    Diagnostic Test Results:  Results for orders placed or performed in visit on 10/04/17 (from the past 24 hour(s))   CK total   Result Value Ref Range    CK Total 148 30 - 225 U/L       ASSESSMENT/PLAN:     1. Seizure (H)   This was thought due to alcohol/drug use.  Did not need further Neurology follow up.  No further seizures reported.    - CK total    2. Anxiety    The risks, benefits and treatment options of prescribed medications or other treatments have been discussed with the patient. The patient verbalized their understanding and should call or follow up if no improvement or if they develop further problems.    - escitalopram (LEXAPRO) 5 MG tablet; Take 1 tablet (5 mg) by mouth daily  Dispense: 30 tablet; Refill: 0    3. " Substance use disorder  Discussed this and plan for sobriety.  Current patient unwilling to try treatment and has not developed a solid plan for maintaining sobriety.  I discussed with mom/patient my concern for re-using.    Patient will see  and get resources for CD.  Advised counseling as well.  Follow up with me in 1 month.    - escitalopram (LEXAPRO) 5 MG tablet; Take 1 tablet (5 mg) by mouth daily  Dispense: 30 tablet; Refill: 0    4. Mild persistent asthma, uncomplicated     - albuterol (PROAIR HFA/PROVENTIL HFA/VENTOLIN HFA) 108 (90 BASE) MCG/ACT Inhaler; Inhale 2 puffs into the lungs every 4 hours as needed for shortness of breath / dyspnea  Dispense: 1 Inhaler; Refill: 5    5. Acute seasonal allergic rhinitis, unspecified trigger       Total times spent with patient 40 minutes of which > 50% of the time was spent counseling and coordination of care discussion as above, review and discussion of hospitalization, CD treatment/resources, treatment plan and follow up       Patient Instructions        Possible side effects of antidepressants/anxiety meds, including but not limited to GI upset, disrupted sleep, loss of libido, worsening of mood or even possible risk of increased suicidal thoughts.   Often some of these things if not severe will improve after 1-2 weeks on medications but some may not see effects for 3-4 weeks,  if tolerable patients should continue meds and see if there is improvement.  If symptoms are intolerable or for any suicidal thoughts the medication should be stopped immediately and contact the clinic.      These medications should be used for 6-9 months before stopping, to avoid rebound symptoms.   Contact the clinic if having any problems tolerating these medications.  Take the medication daily and do not stop the medication abruptly.    Follow up with me 3-4 weeks to discuss improvement and whether or not we need to change meds or increase dose.  Follow up sooner if  problems.      Please plan to contact the clinic or 24 hour nurse line at any time if you are having any thoughts of self harm.    NGUYỄN Walker                  Substance-Induced Anxiety Disorder  What is substance-induced anxiety disorder?  Substance-induced anxiety disorder is anxiety caused by taking a drug or stopping a drug. Many medicines and abused substances can make you feel nervous, worried, or jittery. You may have panic attacks. You may also feel that something terrible is going to happen even when there is no real reason to feel this way.  Medicines or substance use may make an existing anxiety problem worse or cause it to return. This is not substance-induced anxiety. Substance-induced anxiety is directly the result of medicine or substance use.  How does it occur?  Many drugs change the way brain cells communicate with each other. Drugs can also change the amount of chemical messengers, called neurotransmitters, in your nervous system. Having the right balance of these chemical messengers in your nerves and brain is important. Many abused substances and medicines damage parts of the brain that keep anxiety in check.  Frequent use of some substances and medicines can cause anxiety problems. With other substances, withdrawal (stopping use of the drug) can cause anxiety problems for up to 4 weeks after you quit.  Substances and medicines that can cause anxiety problems while you are using them are:  alcohol (beer, wine, or hard liquor)   caffeine   cocaine   decongestants such as pseudoephedrine (Sudafed)   marijuana   hallucinogenic drugs such as PCP and LSD   amphetamines or uppers such as speed, Ritalin, and Dexedrine   inhalants (such as gasoline, spray paint, glue, and some insecticides)   bronchodilators such as those used to treat asthma   medicines for Parkinson's disease such as amantadine (Symmetrel) and levodopa   insulin, used to treat diabetes   birth control pills  Drugs that can  cause anxiety problems for weeks after stopping them are:  alcohol (beer, wine, or hard liquor)   cocaine   marijuana   sedatives, antianxiety medicines, and sleeping medicines   painkillers such as codeine and propoxyphene (Darvon)   steroid medicines   thyroid medicine  What are the symptoms?  You may have symptoms while you are taking the substances or medicines, or for a month after you stop. Besides feeling nervous and worried, you may also:  think that bad things will happen or that you will never get better   have trouble falling asleep or wake up often during the night   lose weight because you don't feel like eating   fear that you are losing control of yourself and will go crazy or will die   have chills, hot flashes, sweating, shaking, or numbness   feel your heart race or pound   have trouble concentrating or remembering things   have trouble breathing or swallowing due to muscle tightness   feel pain in your chest, stomach, or abdomen   throw up or have nausea or diarrhea  How is it diagnosed?  If you think a substance or medicine is causing anxiety, see your healthcare provider. He or she will ask about your symptoms and your drug or alcohol use. You may have some lab tests to rule out medical problems such as hormone imbalances. Blood and urine tests can check for substance abuse and levels of certain medicines in your system.  How is it treated?  You may have to stop or reduce the substance that is causing the anxiety. Do not reduce or stop taking any prescribed medicine without first consulting your healthcare provider. Follow his or her advice on how to stop or reduce what you are taking. It may take up to 4 weeks for the anxiety to get better. Your provider may prescribe antianxiety or antidepressant medicines to help you get over withdrawal symptoms.  Do not try to overcome the abuse of alcohol, cocaine, or amphetamines all by yourself. Get professional help first. Stopping some substances  abruptly can be very dangerous. You might have seizures and heart failure if you stop too quickly.  Psychotherapy  Abuse of substances like alcohol, cocaine, and sedatives can be treated with group or individual psychotherapy. Therapy in a group with others having substance abuse problems is often very helpful. [facility] and [facility] have chapters of Alcoholics Anonymous (AA) and Narcotics Anonymous (NA).   In some cases, medicines for anxiety may help you to stop substance abuse. Discuss the options with your healthcare provider or therapist.  Claims have been made that certain herbal and dietary products help people avoid a return to substance abuse. No herb or dietary supplement has been proven to consistently or completely stop substance abuse. Supplements are not tested or standardized and may vary in strength and effects. They may have side effects and are not always safe.   Learning ways to relax may help. Yoga and meditation may also be helpful. You may want to talk with your healthcare provider about using these methods along with medicines and psychotherapy.   How long will the effects last?  This disorder usually lasts as long as you keep taking the substance causing the anxiety. Symptoms often last up to a month after you stop taking it.  How can I take care of myself?  Check with your healthcare provider about any drug you think might be causing anxiety.  If you are abusing alcohol, cocaine, or sedatives, a substance abuse program can help you stop and handle any withdrawal symptoms.  Once you have stopped substance abuse, maintaining a healthy lifestyle is crucial. To help prepare you to stop substance abuse and prevent a return to drug use:  Get support. Talk with family and friends. Consider joining a support group in your area.   Learn to manage stress. Ask for help at home and work when the load is too great to handle. Find ways to relax, for example take up a hobby, listen to music, watch movies,  take walks. Try deep breathing exercises when you feel stressed.   Take care of your physical health. Try to get at least 7 to 9 hours of sleep each night. Eat a healthy diet. Limit caffeine. If you smoke, quit. Avoid alcohol and drugs, because they can make your symptoms worse. Exercise according to your healthcare provider's instructions.   Avoid situations where people are likely to use alcohol or drugs.   Check your medicines. To help prevent problems, tell your healthcare provider and pharmacist about all the medicines, natural remedies, vitamins, and other supplements that you take.   Contact your healthcare provider or therapist if you have any questions or your symptoms seem to be getting worse.  When should I seek help  If you feel anxious after starting or changing the amount of any medicine you take, talk with your healthcare provider.  Seek professional help if you or a loved one abuses substances like alcohol, cocaine, or sedatives.  Get emergency help immediately if you or a loved one has serious thoughts of suicide or harming others. Call for police help if you or a loved one has violent behavior, such as destroying property or threatening others.    Published by [employer].  This content is reviewed periodically and is subject to change as new health information becomes available. The information is intended to inform and educate and is not a replacement for medical evaluation, advice, diagnosis or treatment by a healthcare professional.  Written by Neisha Ly, PhD, for [employer].    2011 [employer] and/or its affiliates. All rights reserved.                 Larisa Espinal NP  [facility]

## 2017-10-04 NOTE — NURSING NOTE
"Initial BP 99/54  Pulse 84  Temp 98.4  F (36.9  C) (Tympanic)  Ht 5' 5\" (1.651 m)  Wt 126 lb (57.2 kg)  Breastfeeding? No  BMI 20.97 kg/m2 Estimated body mass index is 20.97 kg/(m^2) as calculated from the following:    Height as of this encounter: 5' 5\" (1.651 m).    Weight as of this encounter: 126 lb (57.2 kg). .    Irene Mathur CMA ([facility])  "

## 2017-10-04 NOTE — PATIENT INSTRUCTIONS
Possible side effects of antidepressants/anxiety meds, including but not limited to GI upset, disrupted sleep, loss of libido, worsening of mood or even possible risk of increased suicidal thoughts.   Often some of these things if not severe will improve after 1-2 weeks on medications but some may not see effects for 3-4 weeks,  if tolerable patients should continue meds and see if there is improvement.  If symptoms are intolerable or for any suicidal thoughts the medication should be stopped immediately and contact the clinic.      These medications should be used for 6-9 months before stopping, to avoid rebound symptoms.   Contact the clinic if having any problems tolerating these medications.  Take the medication daily and do not stop the medication abruptly.    Follow up with me 3-4 weeks to discuss improvement and whether or not we need to change meds or increase dose.  Follow up sooner if problems.      Please plan to contact the clinic or 24 hour nurse line at any time if you are having any thoughts of self harm.    NGUYỄN Walker                  Substance-Induced Anxiety Disorder  What is substance-induced anxiety disorder?  Substance-induced anxiety disorder is anxiety caused by taking a drug or stopping a drug. Many medicines and abused substances can make you feel nervous, worried, or jittery. You may have panic attacks. You may also feel that something terrible is going to happen even when there is no real reason to feel this way.  Medicines or substance use may make an existing anxiety problem worse or cause it to return. This is not substance-induced anxiety. Substance-induced anxiety is directly the result of medicine or substance use.  How does it occur?  Many drugs change the way brain cells communicate with each other. Drugs can also change the amount of chemical messengers, called neurotransmitters, in your nervous system. Having the right balance of these chemical messengers in your  nerves and brain is important. Many abused substances and medicines damage parts of the brain that keep anxiety in check.  Frequent use of some substances and medicines can cause anxiety problems. With other substances, withdrawal (stopping use of the drug) can cause anxiety problems for up to 4 weeks after you quit.  Substances and medicines that can cause anxiety problems while you are using them are:  alcohol (beer, wine, or hard liquor)   caffeine   cocaine   decongestants such as pseudoephedrine (Sudafed)   marijuana   hallucinogenic drugs such as PCP and LSD   amphetamines or uppers such as speed, Ritalin, and Dexedrine   inhalants (such as gasoline, spray paint, glue, and some insecticides)   bronchodilators such as those used to treat asthma   medicines for Parkinson's disease such as amantadine (Symmetrel) and levodopa   insulin, used to treat diabetes   birth control pills  Drugs that can cause anxiety problems for weeks after stopping them are:  alcohol (beer, wine, or hard liquor)   cocaine   marijuana   sedatives, antianxiety medicines, and sleeping medicines   painkillers such as codeine and propoxyphene (Darvon)   steroid medicines   thyroid medicine  What are the symptoms?  You may have symptoms while you are taking the substances or medicines, or for a month after you stop. Besides feeling nervous and worried, you may also:  think that bad things will happen or that you will never get better   have trouble falling asleep or wake up often during the night   lose weight because you don't feel like eating   fear that you are losing control of yourself and will go crazy or will die   have chills, hot flashes, sweating, shaking, or numbness   feel your heart race or pound   have trouble concentrating or remembering things   have trouble breathing or swallowing due to muscle tightness   feel pain in your chest, stomach, or abdomen   throw up or have nausea or diarrhea  How is it diagnosed?  If you think a  substance or medicine is causing anxiety, see your healthcare provider. He or she will ask about your symptoms and your drug or alcohol use. You may have some lab tests to rule out medical problems such as hormone imbalances. Blood and urine tests can check for substance abuse and levels of certain medicines in your system.  How is it treated?  You may have to stop or reduce the substance that is causing the anxiety. Do not reduce or stop taking any prescribed medicine without first consulting your healthcare provider. Follow his or her advice on how to stop or reduce what you are taking. It may take up to 4 weeks for the anxiety to get better. Your provider may prescribe antianxiety or antidepressant medicines to help you get over withdrawal symptoms.  Do not try to overcome the abuse of alcohol, cocaine, or amphetamines all by yourself. Get professional help first. Stopping some substances abruptly can be very dangerous. You might have seizures and heart failure if you stop too quickly.  Psychotherapy  Abuse of substances like alcohol, cocaine, and sedatives can be treated with group or individual psychotherapy. Therapy in a group with others having substance abuse problems is often very helpful. Most [facility] and [facility] have chapters of Alcoholics Anonymous (AA) and Narcotics Anonymous (NA).   In some cases, medicines for anxiety may help you to stop substance abuse. Discuss the options with your healthcare provider or therapist.  Claims have been made that certain herbal and dietary products help people avoid a return to substance abuse. No herb or dietary supplement has been proven to consistently or completely stop substance abuse. Supplements are not tested or standardized and may vary in strength and effects. They may have side effects and are not always safe.   Learning ways to relax may help. Yoga and meditation may also be helpful. You may want to talk with your healthcare provider about using these methods  along with medicines and psychotherapy.   How long will the effects last?  This disorder usually lasts as long as you keep taking the substance causing the anxiety. Symptoms often last up to a month after you stop taking it.  How can I take care of myself?  Check with your healthcare provider about any drug you think might be causing anxiety.  If you are abusing alcohol, cocaine, or sedatives, a substance abuse program can help you stop and handle any withdrawal symptoms.  Once you have stopped substance abuse, maintaining a healthy lifestyle is crucial. To help prepare you to stop substance abuse and prevent a return to drug use:  Get support. Talk with family and friends. Consider joining a support group in your area.   Learn to manage stress. Ask for help at home and work when the load is too great to handle. Find ways to relax, for example take up a hobby, listen to music, watch movies, take walks. Try deep breathing exercises when you feel stressed.   Take care of your physical health. Try to get at least 7 to 9 hours of sleep each night. Eat a healthy diet. Limit caffeine. If you smoke, quit. Avoid alcohol and drugs, because they can make your symptoms worse. Exercise according to your healthcare provider's instructions.   Avoid situations where people are likely to use alcohol or drugs.   Check your medicines. To help prevent problems, tell your healthcare provider and pharmacist about all the medicines, natural remedies, vitamins, and other supplements that you take.   Contact your healthcare provider or therapist if you have any questions or your symptoms seem to be getting worse.  When should I seek help  If you feel anxious after starting or changing the amount of any medicine you take, talk with your healthcare provider.  Seek professional help if you or a loved one abuses substances like alcohol, cocaine, or sedatives.  Get emergency help immediately if you or a loved one has serious thoughts of suicide  or harming others. Call for police help if you or a loved one has violent behavior, such as destroying property or threatening others.    Published by [employer].  This content is reviewed periodically and is subject to change as new health information becomes available. The information is intended to inform and educate and is not a replacement for medical evaluation, advice, diagnosis or treatment by a healthcare professional.  Written by Neisha Ly, PhD, for [employer].    2011 [facility] and/or its affiliates. All rights reserved.

## 2017-10-04 NOTE — MR AVS SNAPSHOT
After Visit Summary   10/4/2017    Daniela Byrne    MRN: [id number]           Patient Information     Date Of Birth          1995        Visit Information        Provider Department      10/4/2017 9:20 AM Larisa Espinal NP [facility]        Today's Diagnoses     Anxiety    -  1    Seizure (H)        Substance use disorder        Mild persistent asthma, uncomplicated        Acute seasonal allergic rhinitis, unspecified trigger          Care Instructions         Possible side effects of antidepressants/anxiety meds, including but not limited to GI upset, disrupted sleep, loss of libido, worsening of mood or even possible risk of increased suicidal thoughts.   Often some of these things if not severe will improve after 1-2 weeks on medications but some may not see effects for 3-4 weeks,  if tolerable patients should continue meds and see if there is improvement.  If symptoms are intolerable or for any suicidal thoughts the medication should be stopped immediately and contact the clinic.      These medications should be used for 6-9 months before stopping, to avoid rebound symptoms.   Contact the clinic if having any problems tolerating these medications.  Take the medication daily and do not stop the medication abruptly.    Follow up with me 3-4 weeks to discuss improvement and whether or not we need to change meds or increase dose.  Follow up sooner if problems.      Please plan to contact the clinic or 24 hour nurse line at any time if you are having any thoughts of self harm.    NGUYỄN Walker                  Substance-Induced Anxiety Disorder  What is substance-induced anxiety disorder?  Substance-induced anxiety disorder is anxiety caused by taking a drug or stopping a drug. Many medicines and abused substances can make you feel nervous, worried, or jittery. You may have panic attacks. You may also feel that something terrible is going to happen even when there is  no real reason to feel this way.  Medicines or substance use may make an existing anxiety problem worse or cause it to return. This is not substance-induced anxiety. Substance-induced anxiety is directly the result of medicine or substance use.  How does it occur?  Many drugs change the way brain cells communicate with each other. Drugs can also change the amount of chemical messengers, called neurotransmitters, in your nervous system. Having the right balance of these chemical messengers in your nerves and brain is important. Many abused substances and medicines damage parts of the brain that keep anxiety in check.  Frequent use of some substances and medicines can cause anxiety problems. With other substances, withdrawal (stopping use of the drug) can cause anxiety problems for up to 4 weeks after you quit.  Substances and medicines that can cause anxiety problems while you are using them are:  alcohol (beer, wine, or hard liquor)   caffeine   cocaine   decongestants such as pseudoephedrine (Sudafed)   marijuana   hallucinogenic drugs such as PCP and LSD   amphetamines or uppers such as speed, Ritalin, and Dexedrine   inhalants (such as gasoline, spray paint, glue, and some insecticides)   bronchodilators such as those used to treat asthma   medicines for Parkinson's disease such as amantadine (Symmetrel) and levodopa   insulin, used to treat diabetes   birth control pills  Drugs that can cause anxiety problems for weeks after stopping them are:  alcohol (beer, wine, or hard liquor)   cocaine   marijuana   sedatives, antianxiety medicines, and sleeping medicines   painkillers such as codeine and propoxyphene (Darvon)   steroid medicines   thyroid medicine  What are the symptoms?  You may have symptoms while you are taking the substances or medicines, or for a month after you stop. Besides feeling nervous and worried, you may also:  think that bad things will happen or that you will never get better   have trouble  falling asleep or wake up often during the night   lose weight because you don't feel like eating   fear that you are losing control of yourself and will go crazy or will die   have chills, hot flashes, sweating, shaking, or numbness   feel your heart race or pound   have trouble concentrating or remembering things   have trouble breathing or swallowing due to muscle tightness   feel pain in your chest, stomach, or abdomen   throw up or have nausea or diarrhea  How is it diagnosed?  If you think a substance or medicine is causing anxiety, see your healthcare provider. He or she will ask about your symptoms and your drug or alcohol use. You may have some lab tests to rule out medical problems such as hormone imbalances. Blood and urine tests can check for substance abuse and levels of certain medicines in your system.  How is it treated?  You may have to stop or reduce the substance that is causing the anxiety. Do not reduce or stop taking any prescribed medicine without first consulting your healthcare provider. Follow his or her advice on how to stop or reduce what you are taking. It may take up to 4 weeks for the anxiety to get better. Your provider may prescribe antianxiety or antidepressant medicines to help you get over withdrawal symptoms.  Do not try to overcome the abuse of alcohol, cocaine, or amphetamines all by yourself. Get professional help first. Stopping some substances abruptly can be very dangerous. You might have seizures and heart failure if you stop too quickly.  Psychotherapy  Abuse of substances like alcohol, cocaine, and sedatives can be treated with group or individual psychotherapy. Therapy in a group with others having substance abuse problems is often very helpful. Most [facility] and [facility] have chapters of Alcoholics Anonymous (AA) and Narcotics Anonymous (NA).   In some cases, medicines for anxiety may help you to stop substance abuse. Discuss the options with your healthcare provider or  therapist.  Claims have been made that certain herbal and dietary products help people avoid a return to substance abuse. No herb or dietary supplement has been proven to consistently or completely stop substance abuse. Supplements are not tested or standardized and may vary in strength and effects. They may have side effects and are not always safe.   Learning ways to relax may help. Yoga and meditation may also be helpful. You may want to talk with your healthcare provider about using these methods along with medicines and psychotherapy.   How long will the effects last?  This disorder usually lasts as long as you keep taking the substance causing the anxiety. Symptoms often last up to a month after you stop taking it.  How can I take care of myself?  Check with your healthcare provider about any drug you think might be causing anxiety.  If you are abusing alcohol, cocaine, or sedatives, a substance abuse program can help you stop and handle any withdrawal symptoms.  Once you have stopped substance abuse, maintaining a healthy lifestyle is crucial. To help prepare you to stop substance abuse and prevent a return to drug use:  Get support. Talk with family and friends. Consider joining a support group in your area.   Learn to manage stress. Ask for help at home and work when the load is too great to handle. Find ways to relax, for example take up a hobby, listen to music, watch movies, take walks. Try deep breathing exercises when you feel stressed.   Take care of your physical health. Try to get at least 7 to 9 hours of sleep each night. Eat a healthy diet. Limit caffeine. If you smoke, quit. Avoid alcohol and drugs, because they can make your symptoms worse. Exercise according to your healthcare provider's instructions.   Avoid situations where people are likely to use alcohol or drugs.   Check your medicines. To help prevent problems, tell your healthcare provider and pharmacist about all the medicines, natural  remedies, vitamins, and other supplements that you take.   Contact your healthcare provider or therapist if you have any questions or your symptoms seem to be getting worse.  When should I seek help  If you feel anxious after starting or changing the amount of any medicine you take, talk with your healthcare provider.  Seek professional help if you or a loved one abuses substances like alcohol, cocaine, or sedatives.  Get emergency help immediately if you or a loved one has serious thoughts of suicide or harming others. Call for police help if you or a loved one has violent behavior, such as destroying property or threatening others.    Published by [employer].  This content is reviewed periodically and is subject to change as new health information becomes available. The information is intended to inform and educate and is not a replacement for medical evaluation, advice, diagnosis or treatment by a healthcare professional.  Written by Neisha Ly, PhD, for [employer].    2011 [facility] and/or its affiliates. All rights reserved.                     Follow-ups after your visit        Your next 10 appointments already scheduled     Oct 10, 2017  2:30 PM CDT   Office Visit with Gissell Le MD, [facility]    [address] [address] [phone]           Bring a current list of meds and any records pertaining to this visit. For Physicals, please bring immunization records and any forms needing to be filled out. Please arrive 10 minutes early to complete paperwork.              Who to contact     If you have questions or need follow up information about today's clinic visit or your schedule please contact [facility] directly at [phone].  Normal or non-critical lab and imaging results will be communicated to you by MyChart, letter or phone within 4 business days after the clinic has received the results. If  "you do not hear from us within 7 days, please contact the clinic through "[employer] or phone. If you have a critical or abnormal lab result, we will notify you by phone as soon as possible.  Submit refill requests through "[employer] or call your pharmacy and they will forward the refill request to us. Please allow 3 business days for your refill to be completed.          Additional Information About Your Visit        [employer] Information     "[employer] gives you secure access to your electronic health record. If you see a primary care provider, you can also send messages to your care team and make appointments. If you have questions, please call your primary care clinic.  If you do not have a primary care provider, please call [phone] and they will assist you.        Care EveryWhere ID     This is your Care EveryWhere ID. This could be used by other organizations to access your [facility] medical records  [id number]        Your Vitals Were     Pulse Temperature Height Breastfeeding? BMI (Body Mass Index)       84 98.4  F (36.9  C) (Tympanic) 5' 5\" (1.651 m) No 20.97 kg/m2        Blood Pressure from Last 3 Encounters:   10/04/17 99/54   09/24/17 124/84   04/20/17 104/67    Weight from Last 3 Encounters:   10/04/17 126 lb (57.2 kg)   09/23/17 134 lb 6.4 oz (61 kg)   04/20/17 136 lb (61.7 kg)              Today, you had the following     No orders found for display         Today's Medication Changes          These changes are accurate as of: 10/4/17  9:54 AM.  If you have any questions, ask your nurse or doctor.               Start taking these medicines.        Dose/Directions    escitalopram 5 MG tablet   Commonly known as:  LEXAPRO   Used for:  Anxiety, Substance use disorder   Started by:  Larisa Espinal NP        Dose:  5 mg   Take 1 tablet (5 mg) by mouth daily   Quantity:  30 tablet   Refills:  0            Where to get your medicines      These medications were sent to [facility] [place], [place] " - [address]  [address] [address]     Phone:  [phone]     albuterol 108 (90 BASE) MCG/ACT Inhaler    escitalopram 5 MG tablet                Primary Care Provider Office Phone # Fax #    Larisa Espinal [phone] [address] [address]        Equal Access to Services     RAÚL FUENTES : Hadii aad ku hadasho Soomaali, waaxda luqadaha, qaybta kaalmada adeegyada, waxay idiin hayaan adeeg kharash la'aan ah. So [facility] [phone].    ATENCIÓN: Si habla espanthony, tiene a cook disposición servicios gratuitos de asistencia lingüística. Llame al [phone].    We comply with applicable federal civil rights laws and [place] laws. We do not discriminate on the basis of race, color, national origin, age, disability, sex, sexual orientation, or gender identity.            Thank you!     Thank you for choosing [facility]  for your care. Our goal is always to provide you with excellent care. Hearing back from our patients is one way we can continue to improve our services. Please take a few minutes to complete the written survey that you may receive in the mail after your visit with us. Thank you!             Your Updated Medication List - Protect others around you: Learn how to safely use, store and throw away your medicines at www.disposemymeds.org.          This list is accurate as of: 10/4/17  9:54 AM.  Always use your most recent med list.                   Brand Name Dispense Instructions for use Diagnosis    albuterol 108 (90 BASE) MCG/ACT Inhaler    PROAIR HFA/PROVENTIL HFA/VENTOLIN HFA    1 Inhaler    Inhale 2 puffs into the lungs every 4 hours as needed for shortness of breath / dyspnea    Mild persistent asthma, uncomplicated       DEPO-PROVERA 150 MG/ML injection   Generic drug:  medroxyPROGESTERone      Inject 150 mg into the muscle every 3 months Reported on 3/22/2017    Low grade squamous intraepithelial lesion on cytologic smear  of cervix (LGSIL)       escitalopram 5 MG tablet    LEXAPRO    30 tablet    Take 1 tablet (5 mg) by mouth daily    Anxiety, Substance use disorder

## 2017-10-04 NOTE — LETTER
October 5, 2017      Daniela Byrne  [address] [place] [address]  [place] [address]        Dear ,    We are writing to inform you of your test results.  CK has returned to normal as suspected.  Component      Latest Ref Rng & Units 10/4/2017   CK Total      30 - 225 U/L 148     If you have any questions or concerns, please call the clinic at the number listed above.       Sincerely,        Larisa Espinal NP

## 2017-10-04 NOTE — LETTER
October 5, 2017      Daniela Byrne  [address] [place] [address]  [place] [address]        Dear ,    We are writing to inform you of your test results.    CK has returned to normal as suspected.     Component      Latest Ref Rng & Units 10/4/2017   CK Total      30 - 225 U/L 148     If you have any questions or concerns, please call the clinic at the number listed above.       Sincerely,        Larisa Espinal NP

## 2017-10-10 ENCOUNTER — OFFICE VISIT (OUTPATIENT)
Dept: OBGYN | Facility: CLINIC | Age: 22
End: 2017-10-10
Payer: COMMERCIAL

## 2017-10-10 VITALS
HEART RATE: 95 BPM | DIASTOLIC BLOOD PRESSURE: 97 MMHG | SYSTOLIC BLOOD PRESSURE: 150 MMHG | TEMPERATURE: 97.6 F | WEIGHT: 119 LBS | BODY MASS INDEX: 19.83 KG/M2 | HEIGHT: 65 IN

## 2017-10-10 DIAGNOSIS — Z30.013 ENCOUNTER FOR INITIAL PRESCRIPTION OF INJECTABLE CONTRACEPTIVE: ICD-10-CM

## 2017-10-10 DIAGNOSIS — D06.9 CIN III WITH SEVERE DYSPLASIA: Primary | ICD-10-CM

## 2017-10-10 LAB — HCG UR QL: NEGATIVE

## 2017-10-10 PROCEDURE — 88307 TISSUE EXAM BY PATHOLOGIST: CPT | Performed by: OBSTETRICS & GYNECOLOGY

## 2017-10-10 PROCEDURE — 96372 THER/PROPH/DIAG INJ SC/IM: CPT | Performed by: OBSTETRICS & GYNECOLOGY

## 2017-10-10 PROCEDURE — 88305 TISSUE EXAM BY PATHOLOGIST: CPT | Performed by: OBSTETRICS & GYNECOLOGY

## 2017-10-10 PROCEDURE — 81025 URINE PREGNANCY TEST: CPT | Performed by: OBSTETRICS & GYNECOLOGY

## 2017-10-10 PROCEDURE — 57460 BX OF CERVIX W/SCOPE LEEP: CPT | Performed by: OBSTETRICS & GYNECOLOGY

## 2017-10-10 RX ORDER — MEDROXYPROGESTERONE ACETATE 150 MG/ML
150 INJECTION, SUSPENSION INTRAMUSCULAR
Qty: 3 ML | Refills: 3 | OUTPATIENT
Start: 2017-10-10 | End: 2020-01-06

## 2017-10-10 RX ORDER — HYDROCODONE BITARTRATE AND ACETAMINOPHEN 5; 325 MG/1; MG/1
1-2 TABLET ORAL EVERY 4 HOURS PRN
Qty: 5 TABLET | Refills: 0 | Status: SHIPPED | OUTPATIENT
Start: 2017-10-10 | End: 2018-02-09

## 2017-10-10 NOTE — PROGRESS NOTES
Daniela Byrne is a 22 year old  who presents today  for LEEP (Loop Electrical Excision Procedure) due to MAHAMED 3 on biopsy done 2017.  The LEEP procedure was explained. Discussed possible risks including cervical incompetence, infection, bleeding, discomfort, and possible incomplete excision of the abnormal tissue so close follow up was necessary. Consent was obtained and all questions were answered.    Urine Pregnancy Test: negative     Procedure: Patient was placed in the lithotomy position and the grounding pad was placed on her leg. A coated speculum was used to position the cervix in the midline position.  Under colposcopic visualization, the cervix was cleansed with acetic acid to define the area of abnormality.  Using 1% lidocaine with epinephrine, intracervical injections were performed circumferentially.  A total of 6 cc was used.  After adequate anesthesia, a 10 mm loop electrode was used to excise the abnormal portion of the cervix.  An ECC was performed after excision of the transformation zone.  The 5 mm ball cautery was used to fulgurate the cut surface. With bleeding fairly well controlled, Monsels solution was applied to ensure hemostasis. The patient tolerated the procedure well.    A:  LEEP for MAHAMED 3    P:   Post-LEEP instructions were given to the patient.  She was told to expect heavy discharge for the first 4-7 days and could continue for 2 weeks. Nothing in the vagina and no intercourse for 4-6 weeks.  No heavy lifting for next few days.  Return to clinic if any signs of infection.  Follow up for pap and HPV in 12 months.    Gissell Le M.D.

## 2017-10-10 NOTE — MR AVS SNAPSHOT
After Visit Summary   10/10/2017    Daniela Byrne    MRN: [id number]           Patient Information     Date Of Birth          1995        Visit Information        Provider Department      10/10/2017 2:30 PM Gissell Le MD; [facility]        Today's Diagnoses     MAHAMED III with severe dysplasia    -  1    Encounter for initial prescription of injectable contraceptive           Follow-ups after your visit        Future tests that were ordered for you today     Open Standing Orders        Priority Remaining Interval Expires Ordered    Medroxyprogesterone inj  1mg   (Depo Provera J-Code) Routine 4/4  10/10/2018 10/10/2017    INJECTION INTRAMUSCULAR OR SUB-Q Routine 4/4  10/10/2018 10/10/2017            Who to contact     If you have questions or need follow up information about today's clinic visit or your schedule please contact [facility] directly at [phone].  Normal or non-critical lab and imaging results will be communicated to you by MyChart, letter or phone within 4 business days after the clinic has received the results. If you do not hear from us within 7 days, please contact the clinic through [employer] or phone. If you have a critical or abnormal lab result, we will notify you by phone as soon as possible.  Submit refill requests through [employer] or call your pharmacy and they will forward the refill request to us. Please allow 3 business days for your refill to be completed.          Additional Information About Your Visit        MyChart Information     [employer] gives you secure access to your electronic health record. If you see a primary care provider, you can also send messages to your care team and make appointments. If you have questions, please call your primary care clinic.  If you do not have a primary care provider, please call [phone] and they will assist you.        Care EveryWhere ID     This is your Care EveryWhere ID.  "This could be used by other organizations to access your [place] medical records  [id number]        Your Vitals Were     Pulse Temperature Height Last Period Breastfeeding? BMI (Body Mass Index)    95 97.6  F (36.4  C) (Oral) 5' 5\" (1.651 m) 10/05/2017 No 19.8 kg/m2       Blood Pressure from Last 3 Encounters:   10/10/17 (!) 150/97   10/04/17 99/54   09/24/17 124/84    Weight from Last 3 Encounters:   10/10/17 119 lb (54 kg)   10/04/17 126 lb (57.2 kg)   09/23/17 134 lb 6.4 oz (61 kg)              We Performed the Following     COLP CERVIX/UPPER VAGINA W LOOP ELEC BX CERVIX     HCG qualitative urine - CSC and Range     Surgical pathology exam          Today's Medication Changes          These changes are accurate as of: 10/10/17  3:56 PM.  If you have any questions, ask your nurse or doctor.               Start taking these medicines.        Dose/Directions    HYDROcodone-acetaminophen 5-325 MG per tablet   Commonly known as:  NORCO   Used for:  MAHAMED III with severe dysplasia   Started by:  Gissell Le MD        Dose:  1-2 tablet   Take 1-2 tablets by mouth every 4 hours as needed for moderate to severe pain   Quantity:  5 tablet   Refills:  0         These medicines have changed or have updated prescriptions.        Dose/Directions    * DEPO-PROVERA 150 MG/ML injection   This may have changed:  Another medication with the same name was added. Make sure you understand how and when to take each.   Used for:  Low grade squamous intraepithelial lesion on cytologic smear of cervix (LGSIL)   Generic drug:  medroxyPROGESTERone   Changed by:  Gissell Le MD        Dose:  150 mg   Inject 150 mg into the muscle every 3 months Reported on 3/22/2017   Refills:  0       * medroxyPROGESTERone 150 MG/ML injection   Commonly known as:  DEPO-PROVERA   This may have changed:  You were already taking a medication with the same name, and this prescription was added. Make sure you understand how and when to take each. "   Used for:  Encounter for initial prescription of injectable contraceptive   Changed by:  Gissell Le MD        Dose:  150 mg   Inject 1 mL (150 mg) into the muscle every 3 months   Quantity:  3 mL   Refills:  3       * Notice:  This list has 2 medication(s) that are the same as other medications prescribed for you. Read the directions carefully, and ask your doctor or other care provider to review them with you.         Where to get your medicines      Some of these will need a paper prescription and others can be bought over the counter.  Ask your nurse if you have questions.     Bring a paper prescription for each of these medications     HYDROcodone-acetaminophen 5-325 MG per tablet       You don't need a prescription for these medications     medroxyPROGESTERone 150 MG/ML injection                Primary Care Provider Office Phone # Fax #    Larisa Malissa Espinal [phone] [address] [facility] [address]        Equal Access to Services     RAÚL FUENTES : Hadii aad ku hadasho Soanthony, waaxda luqadaha, qaybta kaalmada adeegyada, morgan varela . So [facility] [phone].    ATENCIÓN: Si habla español, tiene a cook disposición servicios gratuitos de asistencia lingüística. Llame al [phone].    We comply with applicable federal civil rights laws and [place] laws. We do not discriminate on the basis of race, color, national origin, age, disability, sex, sexual orientation, or gender identity.            Thank you!     Thank you for choosing [facility]  for your care. Our goal is always to provide you with excellent care. Hearing back from our patients is one way we can continue to improve our services. Please take a few minutes to complete the written survey that you may receive in the mail after your visit with us. Thank you!             Your Updated Medication List - Protect others around you: Learn how to safely use, store and throw  away your medicines at www.disposemymeds.org.          This list is accurate as of: 10/10/17  3:56 PM.  Always use your most recent med list.                   Brand Name Dispense Instructions for use Diagnosis    albuterol 108 (90 BASE) MCG/ACT Inhaler    PROAIR HFA/PROVENTIL HFA/VENTOLIN HFA    1 Inhaler    Inhale 2 puffs into the lungs every 4 hours as needed for shortness of breath / dyspnea    Mild persistent asthma, uncomplicated       * DEPO-PROVERA 150 MG/ML injection   Generic drug:  medroxyPROGESTERone      Inject 150 mg into the muscle every 3 months Reported on 3/22/2017    Low grade squamous intraepithelial lesion on cytologic smear of cervix (LGSIL)       * medroxyPROGESTERone 150 MG/ML injection    DEPO-PROVERA    3 mL    Inject 1 mL (150 mg) into the muscle every 3 months    Encounter for initial prescription of injectable contraceptive       escitalopram 5 MG tablet    LEXAPRO    30 tablet    Take 1 tablet (5 mg) by mouth daily    Anxiety, Substance use disorder       HYDROcodone-acetaminophen 5-325 MG per tablet    NORCO    5 tablet    Take 1-2 tablets by mouth every 4 hours as needed for moderate to severe pain    MAHAMED III with severe dysplasia       * Notice:  This list has 2 medication(s) that are the same as other medications prescribed for you. Read the directions carefully, and ask your doctor or other care provider to review them with you.

## 2017-10-10 NOTE — NURSING NOTE
"Chief Complaint   Patient presents with     Leep       Initial BP (!) 150/97 (BP Location: Right arm, Patient Position: Chair, Cuff Size: Adult Regular)  Pulse 95  Temp 97.6  F (36.4  C) (Oral)  Ht 5' 5\" (1.651 m)  Wt 119 lb (54 kg)  LMP 10/05/2017  Breastfeeding? No  BMI 19.8 kg/m2 Estimated body mass index is 19.8 kg/(m^2) as calculated from the following:    Height as of this encounter: 5' 5\" (1.651 m).    Weight as of this encounter: 119 lb (54 kg).  Medication Reconciliation: complete   Toyin Guadarrama CMA      "

## 2017-10-10 NOTE — NURSING NOTE
MED: Depo Provera 150mg  RTE: IM  SITE: Ventrogluteal - Left  MFR: [employer]   LOT #: [id number]  EXP:02/2020  NDC #: [address]-0  LAST PAP: Lab Results       Component                Value               Date                       PAP                      ASC-H               03/22/2017            URINE HCG:negative  NXT INJ DUE: Dec. 26- Jan. 9, 2018  ORDERING PROV: Dr. Le    VITAL SIGNS:  BP must be less than 140/90  BP (!) 150/97       Toyin Guadarrama CMA

## 2017-10-12 LAB — COPATH REPORT: NORMAL

## 2017-10-25 ENCOUNTER — TRANSFERRED RECORDS (OUTPATIENT)
Dept: HEALTH INFORMATION MANAGEMENT | Facility: CLINIC | Age: 22
End: 2017-10-25

## 2017-11-30 ENCOUNTER — HOSPITAL ENCOUNTER (OUTPATIENT)
Dept: BEHAVIORAL HEALTH | Facility: CLINIC | Age: 22
Discharge: HOME | End: 2017-11-30
Attending: SOCIAL WORKER | Admitting: SOCIAL WORKER
Payer: COMMERCIAL

## 2017-11-30 PROCEDURE — H0001 ALCOHOL AND/OR DRUG ASSESS: HCPCS

## 2017-11-30 ASSESSMENT — ANXIETY QUESTIONNAIRES
IF YOU CHECKED OFF ANY PROBLEMS ON THIS QUESTIONNAIRE, HOW DIFFICULT HAVE THESE PROBLEMS MADE IT FOR YOU TO DO YOUR WORK, TAKE CARE OF THINGS AT HOME, OR GET ALONG WITH OTHER PEOPLE: SOMEWHAT DIFFICULT
GAD7 TOTAL SCORE: 5
7. FEELING AFRAID AS IF SOMETHING AWFUL MIGHT HAPPEN: NOT AT ALL
2. NOT BEING ABLE TO STOP OR CONTROL WORRYING: NOT AT ALL
6. BECOMING EASILY ANNOYED OR IRRITABLE: SEVERAL DAYS
5. BEING SO RESTLESS THAT IT IS HARD TO SIT STILL: SEVERAL DAYS
3. WORRYING TOO MUCH ABOUT DIFFERENT THINGS: SEVERAL DAYS
1. FEELING NERVOUS, ANXIOUS, OR ON EDGE: SEVERAL DAYS

## 2017-11-30 ASSESSMENT — PATIENT HEALTH QUESTIONNAIRE - PHQ9
5. POOR APPETITE OR OVEREATING: SEVERAL DAYS
SUM OF ALL RESPONSES TO PHQ QUESTIONS 1-9: 4
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
## 2017-12-01 ASSESSMENT — ANXIETY QUESTIONNAIRES: GAD7 TOTAL SCORE: 5
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
## 2018-01-20 ENCOUNTER — HOSPITAL ENCOUNTER (EMERGENCY)
Facility: CLINIC | Age: 23
Discharge: HOME | End: 2018-01-20
Attending: PHYSICIAN ASSISTANT | Admitting: PHYSICIAN ASSISTANT
Payer: COMMERCIAL

## 2018-01-20 VITALS
TEMPERATURE: 97.9 F | RESPIRATION RATE: 16 BRPM | DIASTOLIC BLOOD PRESSURE: 72 MMHG | OXYGEN SATURATION: 99 % | SYSTOLIC BLOOD PRESSURE: 111 MMHG | HEIGHT: 65 IN | HEART RATE: 99 BPM

## 2018-01-20 DIAGNOSIS — J06.9 VIRAL URI WITH COUGH: ICD-10-CM

## 2018-01-20 PROCEDURE — G0463 HOSPITAL OUTPT CLINIC VISIT: HCPCS

## 2018-01-20 PROCEDURE — 99213 OFFICE O/P EST LOW 20 MIN: CPT | Performed by: PHYSICIAN ASSISTANT

## 2018-01-20 RX ORDER — BENZONATATE 100 MG/1
100 CAPSULE ORAL 3 TIMES DAILY PRN
Qty: 42 CAPSULE | Refills: 0 | Status: SHIPPED | OUTPATIENT
Start: 2018-01-20 | End: 2018-02-09

## 2018-01-20 RX ORDER — ALBUTEROL SULFATE 0.83 MG/ML
1 SOLUTION RESPIRATORY (INHALATION) EVERY 6 HOURS PRN
Qty: 75 ML | Refills: 0 | Status: SHIPPED | OUTPATIENT
Start: 2018-01-20 | End: 2020-01-06

## 2018-01-20 NOTE — LETTER
[facility] EMERGENCY DEPARTMENT  [address] [address]  Phone: [phone]  Fax: [phone]    January 20, 2018        Daniela Byrne  [address] [place] [address]          To whom it may concern:    RE: Daniela R Chavez    Daniela was evaluated in the UC for an illness 1/20/18.  She may return to limited activity only as tolerated by her symptoms as long as she remains afebrile with a temp less than 100.4    Please contact me for questions or concerns.      Sincerely,        Aleena Sauceda PA-C

## 2018-01-20 NOTE — ED AVS SNAPSHOT
[facility] Emergency Department    [address] [address]    Phone:  [phone]    Fax:  [phone]                                       Daniela Byrne   MRN: [id number]    Department:  [facility] Emergency Department   Date of Visit:  1/20/2018           After Visit Summary Signature Page     I have received my discharge instructions, and my questions have been answered. I have discussed any challenges I see with this plan with the nurse or doctor.    ..........................................................................................................................................  Patient/Patient Representative Signature      ..........................................................................................................................................  Patient Representative Print Name and Relationship to Patient    ..................................................               ................................................  Date                                            Time    ..........................................................................................................................................  Reviewed by Signature/Title    ...................................................              ..............................................  Date                                                            Time
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
## 2018-01-20 NOTE — DISCHARGE INSTRUCTIONS

## 2018-01-20 NOTE — ED AVS SNAPSHOT
[facility] Emergency Department    [address] [facility] [address]    Phone:  [phone]    Fax:  [phone]                                       Daniela Byrne   MRN: [id number]    Department:  [facility] Emergency Department   Date of Visit:  1/20/2018           Patient Information     Date Of Birth          1995        Your diagnoses for this visit were:     Viral URI with cough        You were seen by Aleena Sauceda PA-C.      Follow-up Information     Follow up with Larisa Espinal NP In 7 days.    Specialty:  Nurse Practitioner - Family    Why:  As needed, If symptoms worsen    Contact information:    [address] [facility] [address]  [phone]          Discharge Instructions         Viral Upper Respiratory Illness (Adult)  You have a viral upper respiratory illness (URI), which is another term for the common cold. This illness is contagious during the first few days. It is spread through the air by coughing and sneezing. It may also be spread by direct contact (touching the sick person and then touching your own eyes, nose, or mouth). Frequent handwashing will decrease risk of spread. Most viral illnesses go away within 7 to 10 days with rest and simple home remedies. Sometimes the illness may last for several weeks. Antibiotics will not kill a virus, and they are generally not prescribed for this condition.    Home care    If symptoms are severe, rest at home for the first 2 to 3 days. When you resume activity, don't let yourself get too tired.    Avoid being exposed to cigarette smoke (yours or others ).    You may use acetaminophen or ibuprofen to control pain and fever, unless another medicine was prescribed. (Note: If you have chronic liver or kidney disease, have ever had a stomach ulcer or gastrointestinal bleeding, or are taking blood-thinning medicines, talk with your healthcare provider before using these medicines.) Aspirin should never be given to  anyone under 18 years of age who is ill with a viral infection or fever. It may cause severe liver or brain damage.    Your appetite may be poor, so a light diet is fine. Avoid dehydration by drinking 6 to 8 glasses of fluids per day (water, soft drinks, juices, tea, or soup). Extra fluids will help loosen secretions in the nose and lungs.    Over-the-counter cold medicines will not shorten the length of time you re sick, but they may be helpful for the following symptoms: cough, sore throat, and nasal and sinus congestion. (Note: Do not use decongestants if you have high blood pressure.)  Follow-up care  Follow up with your healthcare provider, or as advised.  When to seek medical advice  Call your healthcare provider right away if any of these occur:    Cough with lots of colored sputum (mucus)    Severe headache; face, neck, or ear pain    Difficulty swallowing due to throat pain    Fever of 100.4 F (38 C)  Call 911, or get immediate medical care  Call emergency services right away if any of these occur:    Chest pain, shortness of breath, wheezing, or difficulty breathing    Coughing up blood    Inability to swallow due to throat pain  Date Last Reviewed: 9/13/2015 2000-2017 [employer]. [address] [place] [address]. All rights reserved. This information is not intended as a substitute for professional medical care. Always follow your healthcare professional's instructions.          24 Hour Appointment Hotline       To make an appointment at any [facility], call [phone] ([phone]). If you don't have a family doctor or clinic, we will help you find one. [place] clinics are conveniently located to serve the needs of you and your family.             Review of your medicines      START taking        Dose / Directions Last dose taken    benzonatate 100 MG capsule   Commonly known as:  TESSALON   Dose:  100 mg   Quantity:  42 capsule        Take 1 capsule (100 mg) by  mouth 3 times daily as needed   Refills:  0          CONTINUE these medicines which may have CHANGED, or have new prescriptions. If we are uncertain of the size of tablets/capsules you have at home, strength may be listed as something that might have changed.        Dose / Directions Last dose taken    * albuterol 108 (90 BASE) MCG/ACT Inhaler   Commonly known as:  PROAIR HFA/PROVENTIL HFA/VENTOLIN HFA   Dose:  2 puff   What changed:  Another medication with the same name was added. Make sure you understand how and when to take each.   Quantity:  1 Inhaler        Inhale 2 puffs into the lungs every 4 hours as needed for shortness of breath / dyspnea   Refills:  5        * albuterol (2.5 MG/3ML) 0.083% neb solution   Dose:  1 vial   What changed:  You were already taking a medication with the same name, and this prescription was added. Make sure you understand how and when to take each.   Quantity:  75 mL        Take 1 vial (2.5 mg) by nebulization every 6 hours as needed for shortness of breath / dyspnea or wheezing   Refills:  0        * Notice:  This list has 2 medication(s) that are the same as other medications prescribed for you. Read the directions carefully, and ask your doctor or other care provider to review them with you.      Our records show that you are taking the medicines listed below. If these are incorrect, please call your family doctor or clinic.        Dose / Directions Last dose taken    escitalopram 5 MG tablet   Commonly known as:  LEXAPRO   Dose:  5 mg   Quantity:  30 tablet        Take 1 tablet (5 mg) by mouth daily   Refills:  0        HYDROcodone-acetaminophen 5-325 MG per tablet   Commonly known as:  NORCO   Dose:  1-2 tablet   Quantity:  5 tablet        Take 1-2 tablets by mouth every 4 hours as needed for moderate to severe pain   Refills:  0        medroxyPROGESTERone 150 MG/ML injection   Commonly known as:  DEPO-PROVERA   Dose:  150 mg   Quantity:  3 mL        Inject 1 mL (150 mg)  into the muscle every 3 months   Refills:  3                Prescriptions were sent or printed at these locations (2 Prescriptions)                   [facility] [place], [place] - [address] [facility] [address] [facility]   [address] [facility] [address]    Telephone:  [phone]   Fax:  [phone]   Hours:                  E-Prescribed (2 of 2)         albuterol (2.5 MG/3ML) 0.083% neb solution               benzonatate (TESSALON) 100 MG capsule                Orders Needing Specimen Collection     None      Pending Results     No orders found from 1/18/2018 to 1/21/2018.            Pending Culture Results     No orders found from 1/18/2018 to 1/21/2018.            Pending Results Instructions     If you had any lab results that were not finalized at the time of your Discharge, you can call the ED Lab Result RN at [phone]. You will be contacted by this team for any positive Lab results or changes in treatment. The nurses are available 7 days a week from 10A to 6:30P.  You can leave a message 24 hours per day and they will return your call.        Test Results From Your Hospital Stay               Thank you for choosing [facility]       Thank you for choosing [facility] for your care. Our goal is always to provide you with excellent care. Hearing back from our patients is one way we can continue to improve our services. Please take a few minutes to complete the written survey that you may receive in the mail after you visit with us. Thank you!        [employer] Information     [employer] gives you secure access to your electronic health record. If you see a primary care provider, you can also send messages to your care team and make appointments. If you have questions, please call your primary care clinic.  If you do not have a primary care provider, please call [phone] and they will assist you.        Care EveryWhere ID     This is your Care EveryWhere ID. This could be used by other organizations to access your  [facility] medical records  [id number]        Equal Access to Services     RAÚL FUENTES : Hadii sharon Nelson, frederick burdick, samuel pappas, morgan miguel. So [facility] [phone].    ATENCIÓN: Si habla español, tiene a cook disposición servicios gratuitos de asistencia lingüística. Llame al [phone].    We comply with applicable federal civil rights laws and [place] laws. We do not discriminate on the basis of race, color, national origin, age, disability, sex, sexual orientation, or gender identity.            After Visit Summary       This is your record. Keep this with you and show to your community pharmacist(s) and doctor(s) at your next visit.

## 2018-02-01 NOTE — TELEPHONE ENCOUNTER
"Requested Prescriptions   Pending Prescriptions Disp Refills     budesonide (PULMICORT) 0.25 MG/2ML neb solution  Last Written Prescription Date:  01/30/06  Ended: 10/02/07  Last Fill Quantity: 60,  # refills: 0   Last Office Visit with [facility] provider:  10/04/17   Future Office Visit:      60 ampule 0    Inhaled Steroids Protocol Failed    2/1/2018  7:59 AM       Failed - Asthma control test 20 or greater in last 6 months    Please review ACT score.   ACT Total Scores 12/26/2014 7/16/2015 3/22/2017   ACT TOTAL SCORE 21 21 -   ASTHMA ER VISITS 0 = None 0 = None -   ASTHMA HOSPITALIZATIONS 0 = None 0 = None -   ACT TOTAL SCORE (Goal Greater than or Equal to 20) - - 21   In the past 12 months, how many times did you visit the emergency room for your asthma without being admitted to the hospital? - - 0   In the past 12 months, how many times were you hospitalized overnight because of your asthma? - - 0            Passed - Patient is age 12 or older       Passed - Recent (6 mo) or future visit with authorizing provider's specialty    Patient had office visit in the last 6 months or has a visit in the next 30 days with authorizing provider.  See \"Patient Info\" tab in inbasket, or \"Choose Columns\" in Meds & Orders section of the refill encounter.            "

## 2018-02-02 ENCOUNTER — HOSPITAL ENCOUNTER (EMERGENCY)
Facility: CLINIC | Age: 23
Discharge: HOME | End: 2018-02-02
Attending: NURSE PRACTITIONER | Admitting: NURSE PRACTITIONER
Payer: COMMERCIAL

## 2018-02-02 VITALS
DIASTOLIC BLOOD PRESSURE: 89 MMHG | HEIGHT: 65 IN | SYSTOLIC BLOOD PRESSURE: 138 MMHG | HEART RATE: 126 BPM | TEMPERATURE: 98.8 F | OXYGEN SATURATION: 98 % | RESPIRATION RATE: 22 BRPM

## 2018-02-02 DIAGNOSIS — J20.9 ACUTE BRONCHITIS, UNSPECIFIED ORGANISM: ICD-10-CM

## 2018-02-02 DIAGNOSIS — J45.901 EXACERBATION OF ASTHMA, UNSPECIFIED ASTHMA SEVERITY, UNSPECIFIED WHETHER PERSISTENT: ICD-10-CM

## 2018-02-02 PROCEDURE — 99213 OFFICE O/P EST LOW 20 MIN: CPT | Performed by: NURSE PRACTITIONER

## 2018-02-02 PROCEDURE — G0463 HOSPITAL OUTPT CLINIC VISIT: HCPCS

## 2018-02-02 RX ORDER — GUAIFENESIN/DEXTROMETHORPHAN 100-10MG/5
10 SYRUP ORAL EVERY 4 HOURS PRN
Qty: 560 ML | Refills: 0 | Status: SHIPPED | OUTPATIENT
Start: 2018-02-02 | End: 2018-02-09

## 2018-02-02 RX ORDER — PREDNISONE 20 MG/1
TABLET ORAL
Qty: 10 TABLET | Refills: 0 | Status: SHIPPED | OUTPATIENT
Start: 2018-02-02 | End: 2018-02-09

## 2018-02-02 RX ORDER — AZITHROMYCIN 250 MG/1
TABLET, FILM COATED ORAL
Qty: 6 TABLET | Refills: 0 | Status: SHIPPED | OUTPATIENT
Start: 2018-02-02 | End: 2018-02-07

## 2018-02-02 NOTE — ED AVS SNAPSHOT
[facility] Emergency Department    [address] [facility] [address]    Phone:  [phone]    Fax:  [phone]                                       Daniela Byrne   MRN: [id number]    Department:  [facility] Emergency Department   Date of Visit:  2/2/2018           After Visit Summary Signature Page     I have received my discharge instructions, and my questions have been answered. I have discussed any challenges I see with this plan with the nurse or doctor.    ..........................................................................................................................................  Patient/Patient Representative Signature      ..........................................................................................................................................  Patient Representative Print Name and Relationship to Patient    ..................................................               ................................................  Date                                            Time    ..........................................................................................................................................  Reviewed by Signature/Title    ...................................................              ..............................................  Date                                                            Time

## 2018-02-02 NOTE — LETTER
[facility] EMERGENCY DEPARTMENT  [address] [facility] [address] [address]  [phone]          February 2, 2018    RE:  Daniela Byrne                                                                                                                                                       [address] [facility] [address]            To whom it may concern:    Daniela Byrne was under my professional care for illness today in Urgent Care.      Sincerely,         NIA Mcgraw CNP

## 2018-02-02 NOTE — ED AVS SNAPSHOT
[facility] Emergency Department    [address] [facility] [address]    Phone:  [phone]    Fax:  [phone]                                       Daniela Byrne   MRN: [id number]    Department:  [facility] Emergency Department   Date of Visit:  2/2/2018           Patient Information     Date Of Birth          1995        Your diagnoses for this visit were:     Acute bronchitis, unspecified organism     Exacerbation of asthma, unspecified asthma severity, unspecified whether persistent        You were seen by Loren Yadav APRN CNP.      Follow-up Information     Follow up with Larisa Espinal NP.    Specialty:  Nurse Practitioner - Family    Why:  As needed    Contact information:    [address] [facility] [id number] [phone]        Discharge References/Attachments     BRONCHITIS, ANTIBIOTIC TREATMENT (ADULT) (ENGLISH)      24 Hour Appointment Hotline       To make an appointment at any [facility] clinic, call [phone] ([phone]). If you don't have a family doctor or clinic, we will help you find one. [facility] clinics are conveniently located to serve the needs of you and your family.             Review of your medicines      START taking        Dose / Directions Last dose taken    azithromycin 250 MG tablet   Commonly known as:  ZITHROMAX Z-MELVIN   Quantity:  6 tablet        Two tablets on the first day, then one tablet daily for the next 4 days   Refills:  0        guaiFENesin-dextromethorphan 100-10 MG/5ML syrup   Commonly known as:  ROBITUSSIN DM   Dose:  10 mL   Quantity:  560 mL        Take 10 mLs by mouth every 4 hours as needed for cough   Refills:  0        predniSONE 20 MG tablet   Commonly known as:  DELTASONE   Quantity:  10 tablet        Take two tablets (= 40mg) each day for 5 (five) days   Refills:  0          Our records show that you are taking the medicines listed below. If these are incorrect, please call your family doctor or  clinic.        Dose / Directions Last dose taken    * albuterol 108 (90 BASE) MCG/ACT Inhaler   Commonly known as:  PROAIR HFA/PROVENTIL HFA/VENTOLIN HFA   Dose:  2 puff   Quantity:  1 Inhaler        Inhale 2 puffs into the lungs every 4 hours as needed for shortness of breath / dyspnea   Refills:  5        * albuterol (2.5 MG/3ML) 0.083% neb solution   Dose:  1 vial   Quantity:  75 mL        Take 1 vial (2.5 mg) by nebulization every 6 hours as needed for shortness of breath / dyspnea or wheezing   Refills:  0        benzonatate 100 MG capsule   Commonly known as:  TESSALON   Dose:  100 mg   Quantity:  42 capsule        Take 1 capsule (100 mg) by mouth 3 times daily as needed   Refills:  0        escitalopram 5 MG tablet   Commonly known as:  LEXAPRO   Dose:  5 mg   Quantity:  30 tablet        Take 1 tablet (5 mg) by mouth daily   Refills:  0        HYDROcodone-acetaminophen 5-325 MG per tablet   Commonly known as:  NORCO   Dose:  1-2 tablet   Quantity:  5 tablet        Take 1-2 tablets by mouth every 4 hours as needed for moderate to severe pain   Refills:  0        medroxyPROGESTERone 150 MG/ML injection   Commonly known as:  DEPO-PROVERA   Dose:  150 mg   Quantity:  3 mL        Inject 1 mL (150 mg) into the muscle every 3 months   Refills:  3        * Notice:  This list has 2 medication(s) that are the same as other medications prescribed for you. Read the directions carefully, and ask your doctor or other care provider to review them with you.            Prescriptions were sent or printed at these locations (3 Prescriptions)                   [facility] [address] [address]    Telephone:  [phone]   Fax:  [phone]   Hours:                  E-Prescribed (3 of 3)         predniSONE (DELTASONE) 20 MG tablet               azithromycin (ZITHROMAX Z-MELVIN) 250 MG tablet               guaiFENesin-dextromethorphan (ROBITUSSIN DM) 100-10 MG/5ML  syrup                Orders Needing Specimen Collection     None      Pending Results     No orders found from 1/31/2018 to 2/3/2018.            Pending Culture Results     No orders found from 1/31/2018 to 2/3/2018.            Pending Results Instructions     If you had any lab results that were not finalized at the time of your Discharge, you can call the ED Lab Result RN at [phone]. You will be contacted by this team for any positive Lab results or changes in treatment. The nurses are available 7 days a week from 10A to 6:30P.  You can leave a message 24 hours per day and they will return your call.        Test Results From Your Hospital Stay               Thank you for choosing [facility]       Thank you for choosing [facility] for your care. Our goal is always to provide you with excellent care. Hearing back from our patients is one way we can continue to improve our services. Please take a few minutes to complete the written survey that you may receive in the mail after you visit with us. Thank you!        [employer] Information     [employer] gives you secure access to your electronic health record. If you see a primary care provider, you can also send messages to your care team and make appointments. If you have questions, please call your primary care clinic.  If you do not have a primary care provider, please call [phone] and they will assist you.        Care EveryWhere ID     This is your Care EveryWhere ID. This could be used by other organizations to access your [facility] medical records  [id number]        Equal Access to Services     RAÚL FUENTES : Hadii sharon Nelson, frederick burdick, qaybta tomasalmoragn haile. So [facility] [phone].    ATENCIÓN: Si habla español, tiene a cook disposición servicios gratuitos de asistencia lingüística. Llame al [phone].    We comply with applicable federal civil rights laws and [place] laws. We do not  discriminate on the basis of race, color, national origin, age, disability, sex, sexual orientation, or gender identity.            After Visit Summary       This is your record. Keep this with you and show to your community pharmacist(s) and doctor(s) at your next visit.

## 2018-02-03 NOTE — ED NOTES
Patient here for ongoing URI/cough - worsening over the past few weeks.  Patient presents ambualtory to the urgent care.

## 2018-02-05 RX ORDER — BUDESONIDE 0.25 MG/2ML
INHALANT ORAL
Qty: 60 AMPULE | Refills: 0 | Status: CANCELLED | OUTPATIENT
Start: 2018-02-05

## 2018-02-05 NOTE — TELEPHONE ENCOUNTER
Left message for patient to return call to clinic.    Did patient request Puilimcort neb solution or inhaler  Neither are on her current med list, will have to route to provider    Cristiane MURCIA Rn

## 2018-02-09 ENCOUNTER — OFFICE VISIT (OUTPATIENT)
Dept: FAMILY MEDICINE | Facility: CLINIC | Age: 23
End: 2018-02-09
Payer: COMMERCIAL

## 2018-02-09 VITALS
TEMPERATURE: 98 F | HEART RATE: 95 BPM | DIASTOLIC BLOOD PRESSURE: 75 MMHG | BODY MASS INDEX: 17.93 KG/M2 | WEIGHT: 107.6 LBS | HEIGHT: 65 IN | SYSTOLIC BLOOD PRESSURE: 116 MMHG

## 2018-02-09 DIAGNOSIS — J06.9 VIRAL URI WITH COUGH: Primary | ICD-10-CM

## 2018-02-09 DIAGNOSIS — Z30.42 ENCOUNTER FOR SURVEILLANCE OF INJECTABLE CONTRACEPTIVE: ICD-10-CM

## 2018-02-09 LAB — BETA HCG QUAL IFA URINE: NEGATIVE

## 2018-02-09 PROCEDURE — 96372 THER/PROPH/DIAG INJ SC/IM: CPT | Performed by: FAMILY MEDICINE

## 2018-02-09 PROCEDURE — 84703 CHORIONIC GONADOTROPIN ASSAY: CPT | Performed by: FAMILY MEDICINE

## 2018-02-09 PROCEDURE — 99213 OFFICE O/P EST LOW 20 MIN: CPT | Mod: 25 | Performed by: FAMILY MEDICINE

## 2018-02-09 NOTE — PROGRESS NOTES
SUBJECTIVE:   Daniela Byrne is a 22 year old female who presents to clinic today for the following health issues:  Chief Complaint   Patient presents with     Imm/Inj     Depo      Hospital F/U     E R follow up 2/2/18        Asthma Follow-Up    Was ACT completed today?    Yes    ACT Total Scores 3/22/2017   ACT TOTAL SCORE -   ASTHMA ER VISITS -   ASTHMA HOSPITALIZATIONS -   ACT TOTAL SCORE (Goal Greater than or Equal to 20) 21   In the past 12 months, how many times did you visit the emergency room for your asthma without being admitted to the hospital? 0   In the past 12 months, how many times were you hospitalized overnight because of your asthma? 0       Recent asthma triggers that patient is dealing with: None        Amount of exercise or physical activity: 2-3 days/week for an average of 15-30 minutes    Problems taking medications regularly: No    Medication side effects: none    Diet: regular (no restrictions)    ED/UC Followup:    Facility:  [facility]   Date of visit: 2/2/18   Reason for visit: Bronchitis, asthma exacerbartion  Current Status: Still coughing up yellow/clear phlegm, runny nose, throat feels raw from coughing    Still getting the hard deep lingering cough.  Ribs hurts.   Albuterol use not daily any more.  The nasal congestion got better then came back two day later about day 4 of antibiotics and steroids.  No fevers.  Smoking 1/4 ppd which is a decrease from the past.    Did get a period after last DEPO  LMP Feb 5th, still bleeding.    Did not take the lexapro.    Problem list and histories reviewed & adjusted, as indicated.  Additional history: as documented    BP Readings from Last 3 Encounters:   02/09/18 116/75   02/02/18 138/89   01/20/18 111/72    Wt Readings from Last 3 Encounters:   02/09/18 107 lb 9.6 oz (48.8 kg)   10/10/17 119 lb (54 kg)   10/04/17 126 lb (57.2 kg)             Reviewed and updated as needed this visit by clinical staff  Tobacco  Allergies  Med Hx  Surg Hx   "Fam Hx  Soc Hx      Reviewed and updated as needed this visit by Provider         ROS:  Constitutional, HEENT, cardiovascular, pulmonary, gi and gu systems are negative, except as otherwise noted.    OBJECTIVE:     /75  Pulse 95  Temp 98  F (36.7  C) (Tympanic)  Ht 5' 5\" (1.651 m)  Wt 107 lb 9.6 oz (48.8 kg)  BMI 17.91 kg/m2  Body mass index is 17.91 kg/(m^2).  GENERAL: healthy, alert and no distress  HENT: ear canals and TM's normal, nose and mouth without ulcers or lesions  NECK: no adenopathy, no asymmetry, masses, or scars and thyroid normal to palpation  RESP: lungs clear to auscultation - no rales, rhonchi or wheezes  CV: regular rate and rhythm, normal S1 S2, no S3 or S4, no murmur, click or rub, no peripheral edema and peripheral pulses strong  MS: no gross musculoskeletal defects noted, no edema    Diagnostic Test Results:  Results for orders placed or performed in visit on 02/09/18 (from the past 24 hour(s))   Beta HCG qual IFA urine - [facility] and [place]   Result Value Ref Range    Beta HCG Qual IFA Urine Negative NEG^Negative            ASSESSMENT/PLAN:       Daniela was seen today for imm/inj and hospital f/u.    Diagnoses and all orders for this visit:    Viral URI with cough: discussed  Symptomatic treatment  Patient asked for robitussin with codeine and I do not recommend    Encounter for surveillance of injectable contraceptive  -     Beta HCG qual IFA urine - [facility] and [place]        Patient Instructions   Common Cold/Viral Upper Respiratory Infection:   Last about 5 days and cough can last 2-3 weeks.  Come back to clinic or go to ER if difficulty breathing, persistent fevers over 101, or not able to stay hydrated.  Treating the Common Cold  Starting zinc lozenges (zinc acetate or zinc gluconate) within the first 24 hours of symptom onset reduces the severity and duration of cold.  Early use of Echinacea purpurea shortens duration and decreases severity of cold symptoms.  Decongestants " With or Without Antihistamines: Pseudoephedrine(at the pharmacy counter) or phenylephrine decrease nasal swelling to improve air intake and antihistamine (like Benadryl, doxyllamine, or chloripramine) help decrease sneezing and cough, but can make you sleepy so these are commonly in the nighttime cold/flu medications.  Ibuprofen 200mg-400mg every 4 hours can help with body aches, fevers, cough, and sneezing.  Nasal saline rinse or NediPot for congestion, as often as needed.  Increase fluid intake, hot tea with honey can help with a sore throat or cough.  Cold Prevention:  Frequent hand washing can reduce the spread of respiratory viruses in all ages and can reduce transmission from children to other household members  The prophylactic use of vitamin C does not reduce the amount of colds a person gets, but decreases how long the cold lasts by about a day.      Chiropractic can help with the rib pain and back pain.  Ibuprofen as needed  Heating pad for muscle spasm or cold pack as needed for pain      Keanu Singh MD  [facility]

## 2018-02-09 NOTE — NURSING NOTE
"Chief Complaint   Patient presents with     Imm/Inj     [facility] F/U     E R follow up 2/2/18        Initial /75  Pulse 95  Temp 98  F (36.7  C) (Tympanic)  Ht 5' 5\" (1.651 m)  Wt 107 lb 9.6 oz (48.8 kg)  BMI 17.91 kg/m2 Estimated body mass index is 17.91 kg/(m^2) as calculated from the following:    Height as of this encounter: 5' 5\" (1.651 m).    Weight as of this encounter: 107 lb 9.6 oz (48.8 kg).  Medication Reconciliation: complete  "

## 2018-02-09 NOTE — PATIENT INSTRUCTIONS
Common Cold/Viral Upper Respiratory Infection:   Last about 5 days and cough can last 2-3 weeks.  Come back to clinic or go to ER if difficulty breathing, persistent fevers over 101, or not able to stay hydrated.  Treating the Common Cold  Starting zinc lozenges (zinc acetate or zinc gluconate) within the first 24 hours of symptom onset reduces the severity and duration of cold.  Early use of Echinacea purpurea shortens duration and decreases severity of cold symptoms.  Decongestants With or Without Antihistamines: Pseudoephedrine(at the pharmacy counter) or phenylephrine decrease nasal swelling to improve air intake and antihistamine (like Benadryl, doxyllamine, or chloripramine) help decrease sneezing and cough, but can make you sleepy so these are commonly in the nighttime cold/flu medications.  Ibuprofen 200mg-400mg every 4 hours can help with body aches, fevers, cough, and sneezing.  Nasal saline rinse or NediPot for congestion, as often as needed.  Increase fluid intake, hot tea with honey can help with a sore throat or cough.  Cold Prevention:  Frequent hand washing can reduce the spread of respiratory viruses in all ages and can reduce transmission from children to other household members  The prophylactic use of vitamin C does not reduce the amount of colds a person gets, but decreases how long the cold lasts by about a day.      Chiropractic can help with the rib pain and back pain.  Ibuprofen as needed  Heating pad for muscle spasm or cold pack as needed for pain

## 2018-02-10 ASSESSMENT — ASTHMA QUESTIONNAIRES: ACT_TOTALSCORE: 13

## 2018-05-15 ENCOUNTER — TELEPHONE (OUTPATIENT)
Dept: FAMILY MEDICINE | Facility: CLINIC | Age: 23
End: 2018-05-15

## 2018-05-15 NOTE — TELEPHONE ENCOUNTER
Patient is due to update ACT, last done 2/9/18 with a score of 13.  Mild persistent asthma.    Patient is also due for pap (Q 6 months diagnostic).  Please remind.    Letter with ACT mailed to patient.  Will call to review.

## 2018-05-15 NOTE — LETTER
May 15, 2018      Daniela Byrne  [address] [place] [address]        Dear Daniela,     Your [facility] Team works hard to make sure that you and your family receive exceptional care. Enclosed you will find a copy of the Asthma Control Test (ACT) that our clinic uses to monitor and manage your asthma. This test is an assessment tool that we use to determine how well your asthma is controlled.  Please keep a copy of the ACT for your reference when we call you to complete this assessment.     We will call within the next 2 weeks to review the questionnaire.    Thank you for trusting us with your health care.      Sincerely,        Your [facility] Team/leana

## 2018-05-15 NOTE — LETTER
May 31, 2018      Daniela Byrne  [address] [place] [address]        Dear Daniela,     Your clinic record indicates that you are due for a Repeat  Diagnostic pappe and physical exam. Please call the  at [phone] to schedule an appointment.     If you have questions about this letter please contact your provider.      Also, We have been unable to reach you by phone Your [facility] Team works hard to make sure that you  receive exceptional care. Enclosed you will find a copy of the Asthma Control Test (ACT) that our clinic uses to monitor and manage your asthma. This test is an assessment tool that we can use to determine how well your asthma is controlled. Please fill out and mail back the enclosed ACT form. Enclosed is a stamped addressed envelope for you to mail the ACT back to the clinic in. Also, please  keep the ACT that was previously mailed to you  for your reference when we call you in the future  to complete this assessment.            Sincerely,        Larisa Espinal, JORDAN/cb

## 2018-05-31 NOTE — TELEPHONE ENCOUNTER
Panel Management Review      Patient has the following on her problem list:     Asthma review     ACT Total Scores 2/9/2018   ACT TOTAL SCORE -   ASTHMA ER VISITS -   ASTHMA HOSPITALIZATIONS -   ACT TOTAL SCORE (Goal Greater than or Equal to 20) 13   In the past 12 months, how many times did you visit the emergency room for your asthma without being admitted to the hospital? 1   In the past 12 months, how many times were you hospitalized overnight because of your asthma? 0      1. Is Asthma diagnosis on the Problem List? Yes    2. Is Asthma listed on Health Maintenance? Yes    3. Patient is due for:  ACT      Composite cancer screening  Chart review shows that this patient is due/due soon for the following Pap Smear  Summary:    Patient is due/failing the following:   ACT, PAP and PHYSICAL    Action needed:   Patient needs office visit for pappe. and Patient needs to do ACT.    Type of outreach:    Sent letter. with reminder for pappe and copy of ACT to fill out and mail back     Questions for provider review:    None                                                                                                                                    Keith MURCIA CMA

## 2018-06-10 ENCOUNTER — HEALTH MAINTENANCE LETTER (OUTPATIENT)
Age: 23
End: 2018-06-10

## 2018-09-20 ENCOUNTER — TELEPHONE (OUTPATIENT)
Dept: FAMILY MEDICINE | Facility: CLINIC | Age: 23
End: 2018-09-20

## 2018-09-20 NOTE — LETTER
September 20, 2018      Daniela Byrne  [address] [place] [address]        Dear Daniela,     Your [facility] Team works hard to make sure that you and your family receive exceptional care. Enclosed you will find a copy of the Asthma Control Test (ACT) that our clinic uses to monitor and manage your asthma. This test is an assessment tool that we use to determine how well your asthma is controlled.  Please keep a copy of the ACT for your reference when we call you to complete this assessment.     We will call within the next 2 weeks to review the questionnaire.    Thank you for trusting us with your health care.      Sincerely,        Your [facility] Team/leana

## 2018-09-20 NOTE — TELEPHONE ENCOUNTER
Patient is due to update ACT, last done 2/9/18 with a score of 13.  Mild persistent asthma.  Last panel management note 5/15/18.     Patient is also due for pap (Q 6 months diagnostic).  Please remind.  If she has had this done at another facility please ask when, where and results for abstracting.     Letter with ACT mailed to patient.  Will call to review.

## 2018-09-20 NOTE — LETTER
October 4, 2018      Daniela Byrne  [address] [place] [address]        Dear Daniela,        Your clinic record indicates that you are due for a Repeat  Diagnostic pappe and physical exam. Please call the  at [phone] to schedule an appointment.      If you have questions about this letter please contact your provider.        Also, We have been unable to reach you by phone Your [facility] Team works hard to make sure that you  receive exceptional care. Enclosed you will find a copy of the Asthma Control Test (ACT) that our clinic uses to monitor and manage your asthma. This test is an assessment tool that we can use to determine how well your asthma is controlled. Please fill out and mail back the enclosed ACT form. Enclosed is a stamped addressed envelope for you to mail the ACT back to the clinic in. Also, please  keep the ACT that was previously mailed to you  for your reference when we call you in the future  to complete this assessment.                 Sincerely,           Larisa Espinal, JORDAN/cb

## 2018-09-27 NOTE — TELEPHONE ENCOUNTER
Attempted call, phone number listed is no longer valid.  Will send patient a [employer] message to complete the ACT that was mailed to her.    Will postpone x 1 week for patient to view.

## 2018-10-04 NOTE — TELEPHONE ENCOUNTER
Panel Management Review      Patient has the following on her problem list:     Asthma review     ACT Total Scores 2/9/2018   ACT TOTAL SCORE -   ASTHMA ER VISITS -   ASTHMA HOSPITALIZATIONS -   ACT TOTAL SCORE (Goal Greater than or Equal to 20) 13   In the past 12 months, how many times did you visit the emergency room for your asthma without being admitted to the hospital? 1   In the past 12 months, how many times were you hospitalized overnight because of your asthma? 0      1. Is Asthma diagnosis on the Problem List? Yes    2. Is Asthma listed on Health Maintenance? Yes    3. Patient is due for:  ACT      Composite cancer screening  Chart review shows that this patient is due/due soon for the following Pap Smear  Summary:    Patient is due/failing the following:   ACT and PAP    Action needed:   Patient needs to do ACT. and diagnostic pappe     Type of outreach:    patient has not read my chart, or mailed back ACT. Phone # listed is still disconnected. Letter sent     Questions for provider review:    None                                                                                                                                    Keith MURCIA CMA

## 2019-02-07 ENCOUNTER — OFFICE VISIT (OUTPATIENT)
Dept: OBGYN | Facility: CLINIC | Age: 24
End: 2019-02-07

## 2019-02-07 ENCOUNTER — RESULT FOLLOW UP (OUTPATIENT)
Dept: OBGYN | Facility: CLINIC | Age: 24
End: 2019-02-07

## 2019-02-07 VITALS
HEART RATE: 98 BPM | WEIGHT: 167.8 LBS | DIASTOLIC BLOOD PRESSURE: 84 MMHG | HEIGHT: 65 IN | RESPIRATION RATE: 16 BRPM | SYSTOLIC BLOOD PRESSURE: 121 MMHG | BODY MASS INDEX: 27.96 KG/M2 | TEMPERATURE: 96.9 F

## 2019-02-07 DIAGNOSIS — D06.9 CIN III WITH SEVERE DYSPLASIA: ICD-10-CM

## 2019-02-07 DIAGNOSIS — D06.9 CIN III WITH SEVERE DYSPLASIA: Primary | ICD-10-CM

## 2019-02-07 LAB — BETA HCG QUAL IFA URINE: NEGATIVE

## 2019-02-07 PROCEDURE — 88307 TISSUE EXAM BY PATHOLOGIST: CPT | Performed by: OBSTETRICS & GYNECOLOGY

## 2019-02-07 PROCEDURE — 88175 CYTOPATH C/V AUTO FLUID REDO: CPT | Performed by: OBSTETRICS & GYNECOLOGY

## 2019-02-07 PROCEDURE — 84703 CHORIONIC GONADOTROPIN ASSAY: CPT | Performed by: OBSTETRICS & GYNECOLOGY

## 2019-02-07 PROCEDURE — 88305 TISSUE EXAM BY PATHOLOGIST: CPT | Performed by: OBSTETRICS & GYNECOLOGY

## 2019-02-07 PROCEDURE — 87624 HPV HI-RISK TYP POOLED RSLT: CPT | Performed by: OBSTETRICS & GYNECOLOGY

## 2019-02-07 PROCEDURE — 57460 BX OF CERVIX W/SCOPE LEEP: CPT | Performed by: OBSTETRICS & GYNECOLOGY

## 2019-02-07 RX ORDER — IBUPROFEN 600 MG/1
600 TABLET, FILM COATED ORAL EVERY 6 HOURS PRN
Qty: 15 TABLET | Refills: 0 | Status: SHIPPED | OUTPATIENT
Start: 2019-02-07 | End: 2020-02-07

## 2019-02-07 RX ORDER — HYDROCODONE BITARTRATE AND ACETAMINOPHEN 5; 325 MG/1; MG/1
1 TABLET ORAL EVERY 6 HOURS PRN
Qty: 4 TABLET | Refills: 0 | Status: SHIPPED | OUTPATIENT
Start: 2019-02-07 | End: 2019-02-10

## 2019-02-07 RX ORDER — ACETAMINOPHEN 325 MG/1
325-650 TABLET ORAL EVERY 6 HOURS PRN
Qty: 30 TABLET | Refills: 0 | Status: SHIPPED | OUTPATIENT
Start: 2019-02-07 | End: 2020-02-07

## 2019-02-07 ASSESSMENT — MIFFLIN-ST. JEOR: SCORE: 1507.02

## 2019-02-07 NOTE — NURSING NOTE
"Initial /84 (BP Location: Left arm, Patient Position: Chair, Cuff Size: Adult Regular)   Pulse 98   Temp 96.9  F (36.1  C) (Tympanic)   Resp 16   Ht 1.651 m (5' 5\")   Wt 76.1 kg (167 lb 12.8 oz)   LMP 01/07/2019   Breastfeeding? No   BMI 27.92 kg/m   Estimated body mass index is 27.92 kg/m  as calculated from the following:    Height as of this encounter: 1.651 m (5' 5\").    Weight as of this encounter: 76.1 kg (167 lb 12.8 oz). .    Toyin Guadarrama, STEFFI    "

## 2019-02-07 NOTE — LETTER
January 28, 2020      Daniela Byrne  [address] [address]    Dear ,      This letter is to remind you that you are due for your follow up PAP smear and HPV test on or about 2/7/20.    Please call [phone] to schedule your appointment at your earliest convenience.     If you have completed the tests outside of [place], please have the results forwarded to our office. We will update the chart for your primary Physician to review before your next annual physical.     Sincerely,      Your [place] Care Team/rlm

## 2019-02-07 NOTE — PROGRESS NOTES
Daniela Byrne is a 23 year old  who presents today  for LEEP (Loop Electrical Excision Procedure)    Pap hx:  12: Pap - ASCUS. Repeat pap in 1 year. (< 21 yrs of age).  14: Pap - ASCUS, + HR HPV 16. Repeat pap in 1 year. (19 yrs old).   10/6/15 pap LSIL/+ HR HPV 16 and other HR HPV. Plan: colp  11/5/15: [place] Bx - MAHAMED 2 & 3, ECC - HSIL. Plan pap and colp in 6 months.   Pt failed follow-up.   3/22/17: ASC-H Pap. Plan colp  17: [place] ECC - MAHAMED 3. Plan LEEP  10/10/17: LEEP Bx - MAHAMED 2/3 with positive margins, ECC - MAHAMED 1, can't exclude MAHAMED 2. Plan repeat LEEP in  - per pt.   18 3 month LEEP not done, updated to 6mo LEEP/Pap due by 18 Lost to follow-up for pap tracking      The LEEP procedure was explained. Discussed possible risks including cervical incompetence, infection, bleeding, discomfort, and possible incomplete excision of the abnormal tissue so close follow up was necessary. Consent was obtained and all questions were answered.    Urine Pregnancy Test: negative     Procedure: Patient was placed in the lithotomy position and the grounding pad was placed on her leg. A coated speculum was used to position the cervix in the midline position.  Under colposcopic visualization, the cervix was cleansed with acetic acid to define the area of abnormality.  Using 1% lidocaine with epinephrine, intracervical injections were performed circumferentially.  A total of 8 cc was used.  After adequate anesthesia, a 10 mm loop electrode was used to excise the abnormal portion of the cervix.  An ECC was performed after excision of the transformation zone.  The 5 mm ball cautery was used to fulgurate the cut surface. With bleeding fairly well controlled, Monsels solution was applied to ensure hemostasis. The specimen was tagged at the 12 o'clock position with suture for position identification.  The patient tolerated the procedure well.    A:  LEEP for MAHAMED 3    P:   Post-LEEP instructions  were given to the patient.  She was told to expect heavy discharge for the first 4-7 days and could continue for 2 weeks. Nothing in the vagina and no intercourse for 4-6 weeks.  No heavy lifting for next few days.  Return to clinic if any signs of infection.  Follow up for pap and HPV in 12 months.    Gissell Le M.D.

## 2019-02-07 NOTE — LETTER
January 24, 2020      Daniela Byrne  [address] [address]    Dear ,      This letter is to remind you that you are due for your follow up PAP smear and HPV test on or about 2/7/20.    Please call [phone] to schedule your appointment at your earliest convenience.     If you have completed the tests outside of [place], please have the results forwarded to our office. We will update the chart for your primary Physician to review before your next annual physical.     Sincerely,      Your [place] Care Team/rlm

## 2019-02-07 NOTE — LETTER
February 19, 2019    Daniela Byrne  [address] [facility] [address]      Dear ,      We are contacting you in writing because we are unable to reach you by phone regarding your recent LEEP results.      The results of the biopsy and ECC were reported as Normal.    Therefore, current guidelines recommend you return in 1 year for your next Pap smear and HPV test.    If you have additional questions regarding this result, please call our registered nurse, Daphney at [phone].    Sincerely,      Gissell Le MD/sharon

## 2019-02-11 LAB — COPATH REPORT: NORMAL

## 2019-02-12 LAB
COPATH REPORT: NORMAL
PAP: NORMAL

## 2019-02-15 LAB
FINAL DIAGNOSIS: NORMAL
HPV HR 12 DNA CVX QL NAA+PROBE: NEGATIVE
HPV16 DNA SPEC QL NAA+PROBE: NEGATIVE
HPV18 DNA SPEC QL NAA+PROBE: NEGATIVE
SPECIMEN DESCRIPTION: NORMAL
SPECIMEN SOURCE CVX/VAG CYTO: NORMAL

## 2019-02-15 NOTE — PROGRESS NOTES
9/28/12: Pap - ASCUS. Repeat pap in 1 year. (< 19 yrs of age).  7/31/14: Pap - ASCUS, + HR HPV 16. Repeat pap in 1 year. (19 yrs old).   10/6/15 pap LSIL/+ HR HPV 16 and other HR HPV. Plan: colp  11/5/15: [place] Bx - MAHAMED 2 & 3, ECC - HSIL. Plan pap and colp in 6 months.   Pt failed follow-up.   3/22/17: ASC-H Pap. Plan colp  4/20/17: [place] ECC - MAHAMED 3. Plan LEEP  10/10/17: LEEP Bx - MAHAMED 2/3 with positive margins, ECC - MAHAMED 1, can't exclude MAHAMED 2. Plan repeat LEEP in Jan - per pt.   2/4/18 3 month LEEP not done, updated to 6mo LEEP/Pap due by 4/1018 11/14/18 Lost to follow-up for pap tracking  2/7/19: LEEP: Neg, ECC - Neg. NIL pap, Neg HPV. Plan  cotesting in 1 year (MRA)  2/19/19 Result letter sent per RN request (rlm)  1/28/20 Cotest reminder letter sent (rlm)  2/24/20 Reminder call placed, someone answered but would not say anything as I said hello, additional reminder letter sent (rlm). CCT Tracking.

## 2019-04-11 NOTE — PROGRESS NOTES
INTERNAL MEDICINE PROGRESS NOTE    Daniela Byrne ([id number]) admitted on 9/17/2017 09/19/2017    Assessment & Plan:   Daniela Byrne is a 22 year old female with a history of alcohol and substance abuse who was admitted for possible first seizure after use of illicit drugs.    Changes today:   -- Rate of IVF reduced from 200 to 150 cc/hr  -- Monitor CK  -- Hepatitis panel and HIV  -- Ordered PTA albuterol PRN    # Possible tonic-clonic seizure  # Rhabdomyolysis  # Diffuse cerebral volume loss.  Patient noted that she was using LSD at a friend's house and her friends witness her having a tonic-clonic seizure. This is her first episode of seizure. The seizure activity is likely due to either substance use and/or alcohol withdrawal. Drug tox positive for amphetamines and ethanol. Endorsed previously using LSD, methamphetamine, cocaine, ecstasy, and alcohol regularly. Rhabdomyolysis likely due to seizure. CT head showed diffuse cerebral volume loss.  -- Psych consulted - no need for inpatient psych  -- MSSA protocol with Ativan    # Thrombocytopenia  Etiology uncertain. Uncertain whether dilutional. Baseline 200s, currently 80-90s. Uncertain whether due to illicit drug use.  -- Continue to monitor    Chronic problems:  # Asthma:  -- PTA albuterol PRN    FEN: Regular diet  Prophylaxis:   Disposition: Anticipate discharge home tomorrow  Code Status: Full Code      Patient was seen and discussed with Dr. NATH who agrees with above assessment and plan.      Brenda Geiger MD, PhD  PGY-2 Internal Medicine  [id number]      ==================================================================    Interval Events:  No acute events overnight. Denies chest pain, SOB, or abdominal pain. Notes that she plans to live with her mother on discharge.    Objective:  Most recent vital signs:  /83 (BP Location: Right arm)  Pulse 56  Temp 97.7  F (36.5  C) (Oral)  Resp 16  Wt 57.8 kg (127 lb 8 oz)  SpO2 98%  BMI 21.22 kg/m2  Temp:   [97.7  F (36.5  C)-98.8  F (37.1  C)] 97.7  F (36.5  C)  Pulse:  [54-76] 56  Resp:  [16-18] 16  BP: (107-120)/(61-83) 110/83  SpO2:  [97 %-100 %] 98 %  Wt Readings from Last 2 Encounters:   09/19/17 57.8 kg (127 lb 8 oz)   04/20/17 61.7 kg (136 lb)         Physical exam:  General: Patient lying comfortably in bed, NAD  HEENT: NC/AT, EOMI, PERRL, no scleral icterus or injection, MMM  CV: RRR, normal S1 and S2, no murmurs, 2+ bilateral radial pulses  Respiratory: CTAB, no w/r/r  GI: soft, NT/ND, NABS, no guarding or rebound  Extremities: No LE edema  Skin: No acute lesions appreciated  Neuro: AOx3, CN II-XII grossly intact, no focal neurological deficits    Labs: Reviewed.          Physician Attestation  I, Shruthi NATH, saw this patient with the resident and agree with the resident s findings and plan of care as documented in the resident s note.      I personally reviewed vital signs, medications, labs and imaging.    Counseled patient about substance abuse. Likely d/c to home with mother tomorrow.      Shruthi NATH MD  Date of Service (when I saw the patient): 9/19/17     Complex Repair And Ftsg Text: The defect edges were debeveled with a #15 scalpel blade.  The primary defect was closed partially with a complex linear closure.  Given the location of the defect, shape of the defect and the proximity to free margins a full thickness skin graft was deemed most appropriate to repair the remaining defect.  The graft was trimmed to fit the size of the remaining defect.  The graft was then placed in the primary defect, oriented appropriately, and sutured into place.

## 2019-06-17 PROBLEM — J30.2 ACUTE SEASONAL ALLERGIC RHINITIS: Status: ACTIVE | Noted: 2017-10-04

## 2019-11-03 ENCOUNTER — HEALTH MAINTENANCE LETTER (OUTPATIENT)
Age: 24
End: 2019-11-03

## 2019-12-26 ENCOUNTER — COMMUNICATION - HEALTHEAST (OUTPATIENT)
Dept: SCHEDULING | Facility: CLINIC | Age: 24
End: 2019-12-26

## 2020-01-06 ENCOUNTER — APPOINTMENT (OUTPATIENT)
Dept: ULTRASOUND IMAGING | Facility: CLINIC | Age: 25
End: 2020-01-06
Attending: FAMILY MEDICINE
Payer: COMMERCIAL

## 2020-01-06 ENCOUNTER — HOSPITAL ENCOUNTER (EMERGENCY)
Facility: CLINIC | Age: 25
Discharge: HOME | End: 2020-01-06
Attending: FAMILY MEDICINE | Admitting: FAMILY MEDICINE
Payer: COMMERCIAL

## 2020-01-06 VITALS
RESPIRATION RATE: 16 BRPM | OXYGEN SATURATION: 100 % | WEIGHT: 140 LBS | TEMPERATURE: 97.8 F | BODY MASS INDEX: 23.3 KG/M2 | DIASTOLIC BLOOD PRESSURE: 70 MMHG | HEART RATE: 127 BPM | SYSTOLIC BLOOD PRESSURE: 104 MMHG

## 2020-01-06 DIAGNOSIS — Z3A.30 30 WEEKS GESTATION OF PREGNANCY: ICD-10-CM

## 2020-01-06 DIAGNOSIS — N30.00 ACUTE CYSTITIS WITHOUT HEMATURIA: ICD-10-CM

## 2020-01-06 LAB
ALBUMIN SERPL-MCNC: 2.3 G/DL (ref 3.4–5)
ALBUMIN UR-MCNC: 100 MG/DL
ALP SERPL-CCNC: 115 U/L (ref 40–150)
ALT SERPL W P-5'-P-CCNC: 17 U/L (ref 0–50)
ANION GAP SERPL CALCULATED.3IONS-SCNC: 8 MMOL/L (ref 3–14)
APPEARANCE UR: ABNORMAL
AST SERPL W P-5'-P-CCNC: 13 U/L (ref 0–45)
B-HCG SERPL-ACNC: ABNORMAL IU/L (ref 0–5)
BACTERIA #/AREA URNS HPF: ABNORMAL /HPF
BASOPHILS # BLD AUTO: 0 10E9/L (ref 0–0.2)
BASOPHILS NFR BLD AUTO: 0.2 %
BILIRUB SERPL-MCNC: 0.2 MG/DL (ref 0.2–1.3)
BILIRUB UR QL STRIP: NEGATIVE
BUN SERPL-MCNC: 5 MG/DL (ref 7–30)
CALCIUM SERPL-MCNC: 8.6 MG/DL (ref 8.5–10.1)
CHLORIDE SERPL-SCNC: 102 MMOL/L (ref 94–109)
CO2 SERPL-SCNC: 23 MMOL/L (ref 20–32)
COLOR UR AUTO: YELLOW
CREAT SERPL-MCNC: 0.52 MG/DL (ref 0.52–1.04)
DIFFERENTIAL METHOD BLD: ABNORMAL
EOSINOPHIL # BLD AUTO: 0 10E9/L (ref 0–0.7)
EOSINOPHIL NFR BLD AUTO: 0 %
ERYTHROCYTE [DISTWIDTH] IN BLOOD BY AUTOMATED COUNT: 12.2 % (ref 10–15)
GFR SERPL CREATININE-BSD FRML MDRD: >90 ML/MIN/{1.73_M2}
GLUCOSE SERPL-MCNC: 118 MG/DL (ref 70–99)
GLUCOSE UR STRIP-MCNC: NEGATIVE MG/DL
HCG UR QL: POSITIVE
HCT VFR BLD AUTO: 28.4 % (ref 35–47)
HGB BLD-MCNC: 9.9 G/DL (ref 11.7–15.7)
HGB UR QL STRIP: ABNORMAL
IMM GRANULOCYTES # BLD: 0.1 10E9/L (ref 0–0.4)
IMM GRANULOCYTES NFR BLD: 1 %
KETONES UR STRIP-MCNC: 20 MG/DL
LEUKOCYTE ESTERASE UR QL STRIP: ABNORMAL
LIPASE SERPL-CCNC: 49 U/L (ref 73–393)
LYMPHOCYTES # BLD AUTO: 1.4 10E9/L (ref 0.8–5.3)
LYMPHOCYTES NFR BLD AUTO: 10.8 %
MCH RBC QN AUTO: 31.9 PG (ref 26.5–33)
MCHC RBC AUTO-ENTMCNC: 34.9 G/DL (ref 31.5–36.5)
MCV RBC AUTO: 92 FL (ref 78–100)
MONOCYTES # BLD AUTO: 1.4 10E9/L (ref 0–1.3)
MONOCYTES NFR BLD AUTO: 10.3 %
NEUTROPHILS # BLD AUTO: 10.3 10E9/L (ref 1.6–8.3)
NEUTROPHILS NFR BLD AUTO: 77.7 %
NITRATE UR QL: POSITIVE
NRBC # BLD AUTO: 0 10*3/UL
NRBC BLD AUTO-RTO: 0 /100
PH UR STRIP: 6 PH (ref 5–7)
PLATELET # BLD AUTO: 201 10E9/L (ref 150–450)
POTASSIUM SERPL-SCNC: 3.5 MMOL/L (ref 3.4–5.3)
PROT SERPL-MCNC: 6.7 G/DL (ref 6.8–8.8)
RBC # BLD AUTO: 3.1 10E12/L (ref 3.8–5.2)
RBC #/AREA URNS AUTO: 1 /HPF (ref 0–2)
SODIUM SERPL-SCNC: 133 MMOL/L (ref 133–144)
SOURCE: ABNORMAL
SP GR UR STRIP: 1.01 (ref 1–1.03)
SQUAMOUS #/AREA URNS AUTO: <1 /HPF (ref 0–1)
UROBILINOGEN UR STRIP-MCNC: 4 MG/DL (ref 0–2)
WBC # BLD AUTO: 13.3 10E9/L (ref 4–11)
WBC #/AREA URNS AUTO: >182 /HPF (ref 0–5)

## 2020-01-06 PROCEDURE — 85025 COMPLETE CBC W/AUTO DIFF WBC: CPT | Performed by: FAMILY MEDICINE

## 2020-01-06 PROCEDURE — 81001 URINALYSIS AUTO W/SCOPE: CPT | Performed by: EMERGENCY MEDICINE

## 2020-01-06 PROCEDURE — 83690 ASSAY OF LIPASE: CPT | Performed by: FAMILY MEDICINE

## 2020-01-06 PROCEDURE — 81025 URINE PREGNANCY TEST: CPT | Performed by: EMERGENCY MEDICINE

## 2020-01-06 PROCEDURE — 80053 COMPREHEN METABOLIC PANEL: CPT | Performed by: FAMILY MEDICINE

## 2020-01-06 PROCEDURE — 99284 EMERGENCY DEPT VISIT MOD MDM: CPT | Mod: Z6 | Performed by: FAMILY MEDICINE

## 2020-01-06 PROCEDURE — 84702 CHORIONIC GONADOTROPIN TEST: CPT | Performed by: FAMILY MEDICINE

## 2020-01-06 PROCEDURE — 99284 EMERGENCY DEPT VISIT MOD MDM: CPT

## 2020-01-06 PROCEDURE — 76816 OB US FOLLOW-UP PER FETUS: CPT

## 2020-01-06 RX ORDER — ALBUTEROL SULFATE 90 UG/1
2 AEROSOL, METERED RESPIRATORY (INHALATION) EVERY 4 HOURS PRN
Qty: 1 INHALER | Refills: 1 | Status: SHIPPED | OUTPATIENT
Start: 2020-01-06 | End: 2023-07-30

## 2020-01-06 RX ORDER — AMOXICILLIN 500 MG/1
500 CAPSULE ORAL 3 TIMES DAILY
Qty: 21 CAPSULE | Refills: 0 | Status: SHIPPED | OUTPATIENT
Start: 2020-01-06 | End: 2020-01-13
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
## 2020-01-06 NOTE — ED AVS SNAPSHOT
[facility] Emergency Department  [address] [address]  Phone:  [phone]  Fax:  [phone]                                    Daniela Byrne   MRN: [id number]    Department:  [facility] Emergency Department   Date of Visit:  1/6/2020           After Visit Summary Signature Page    I have received my discharge instructions, and my questions have been answered. I have discussed any challenges I see with this plan with the nurse or doctor.    ..........................................................................................................................................  Patient/Patient Representative Signature      ..........................................................................................................................................  Patient Representative Print Name and Relationship to Patient    ..................................................               ................................................  Date                                   Time    ..........................................................................................................................................  Reviewed by Signature/Title    ...................................................              ..............................................  Date                                               Time          22EPIC Rev 08/18

## 2020-01-06 NOTE — ED NOTES
Pt has abdominal pain, has hx of constipation. Pt abdomen is very distended, pt says she has had negative  pregnancy tests at home. Pt has been mostly sober from ETOH the last 2 years, says she has an occasional beer. Bowel sounds are hypoactive and very difficult to hear.

## 2020-01-07 NOTE — DISCHARGE INSTRUCTIONS
Return to the Emergency Room if the following occurs:     Severely worsened pain, vomiting and dehydration, or for any concern at anytime.    Or, follow-up with the following provider as we discussed:     Return to your primary doctor tomorrow as scheduled.    Medications discussed:    Amoxicillin.  Albuterol as needed for coughing/wheezing.  Tylenol for pain control.    If you received pain-relieving or sedating medication during your time in the ER, avoid alcohol, driving automobiles, or working with machinery.  Also, a responsible adult must stay with you.        Call the Nurse Advice Line at [phone] or [phone] for any concern at anytime.

## 2020-01-07 NOTE — RESULT ENCOUNTER NOTE
"Unable to leave a message on cell phone # [phone] and home phone  Per ED Provider visit note, \"...She has appointment with her primary doctor tomorrow...\""

## 2020-11-16 ENCOUNTER — HEALTH MAINTENANCE LETTER (OUTPATIENT)
Age: 25
End: 2020-11-16

## 2021-06-04 NOTE — TELEPHONE ENCOUNTER
"She has been having abdominal pain intermittently for a couple of months.  Three OTC pregnancy tests have been negative.  \"I look pregnant\"  At least once daily she will have a sharp pain that makes her say \"ouch\"  She typically only has one BM a week. She has occasional nausea; no vomiting.  No fever.  Sent to scheduling to make OVJosue Grajeda had to leave a .  Dayanara Moreno RN, BAN, Nurse Advisor, [place] 11p-7a        Reason for Disposition    Abdominal pain is a chronic symptom (recurrent or ongoing AND present > 4 weeks)    Answer Assessment - Initial Assessment Questions  1. LOCATION: \"Where does it hurt?\"       It kind of the entire abdomen but mostly below the navel  2. RADIATION: \"Does the pain shoot anywhere else?\" (e.g., chest, back)      No  3. ONSET: \"When did the pain begin?\" (e.g., minutes, hours or days ago)       A couple of months ago  4. SUDDEN: \"Gradual or sudden onset?\"      Gradual  5. PATTERN \"Does the pain come and go, or is it constant?\"     - If constant: \"Is it getting better, staying the same, or worsening?\"       (Note: Constant means the pain never goes away completely; most serious pain is constant and it progresses)      - If intermittent: \"How long does it last?\" \"Do you have pain now?\"      (Note: Intermittent means the pain goes away completely between bouts)      Comes/goes  6. SEVERITY: \"How bad is the pain?\"  (e.g., Scale 1-10; mild, moderate, or severe)    - MILD (1-3): doesn't interfere with normal activities, abdomen soft and not tender to touch     - MODERATE (4-7): interferes with normal activities or awakens from sleep, tender to touch     - SEVERE (8-10): excruciating pain, doubled over, unable to do any normal activities       Mild.  Occasional sharp pains that take her breath away \"ouch\".  At least once per day  7. RECURRENT SYMPTOM: \"Have you ever had this type of abdominal pain before?\" If so, ask: \"When was the last time?\" and \"What happened that time?\"       Not " "until a couple of months ago  8. CAUSE: \"What do you think is causing the abdominal pain?\"      Unsure .  Hx of constipation on occasion.  Usually once a week BM.    9. RELIEVING/AGGRAVATING FACTORS: \"What makes it better or worse?\" (e.g., movement, antacids, bowel movement)      Nothing seems to improve or aggravate   10. OTHER SYMPTOMS: \"Has there been any vomiting, diarrhea, constipation, or urine problems?\"        Occasional nauses  11. PREGNANCY: \"Is there any chance you are pregnant?\" \"When was your last menstrual period?\"        Three home pregnancy tests have been negative.  Periods are regular    Protocols used: ABDOMINAL PAIN - FEMALE-A-AH    "

## 2021-09-18 ENCOUNTER — HEALTH MAINTENANCE LETTER (OUTPATIENT)
Age: 26
End: 2021-09-18

## 2022-01-02 ENCOUNTER — HEALTH MAINTENANCE LETTER (OUTPATIENT)
Age: 27
End: 2022-01-02

## 2022-05-12 NOTE — MR AVS SNAPSHOT
After Visit Summary   2/9/2018    Daniela Byrne    MRN: [id number]           Patient Information     Date Of Birth          1995        Visit Information        Provider Department      2/9/2018 1:40 PM Keanu Singh MD [facility]        Today's Diagnoses     Encounter for surveillance of injectable contraceptive    -  1    Viral URI with cough          Care Instructions    Common Cold/Viral Upper Respiratory Infection:   Last about 5 days and cough can last 2-3 weeks.  Come back to clinic or go to ER if difficulty breathing, persistent fevers over 101, or not able to stay hydrated.  Treating the Common Cold  Starting zinc lozenges (zinc acetate or zinc gluconate) within the first 24 hours of symptom onset reduces the severity and duration of cold.  Early use of Echinacea purpurea shortens duration and decreases severity of cold symptoms.  Decongestants With or Without Antihistamines: Pseudoephedrine(at the pharmacy counter) or phenylephrine decrease nasal swelling to improve air intake and antihistamine (like Benadryl, doxyllamine, or chloripramine) help decrease sneezing and cough, but can make you sleepy so these are commonly in the nighttime cold/flu medications.  Ibuprofen 200mg-400mg every 4 hours can help with body aches, fevers, cough, and sneezing.  Nasal saline rinse or NediPot for congestion, as often as needed.  Increase fluid intake, hot tea with honey can help with a sore throat or cough.  Cold Prevention:  Frequent hand washing can reduce the spread of respiratory viruses in all ages and can reduce transmission from children to other household members  The prophylactic use of vitamin C does not reduce the amount of colds a person gets, but decreases how long the cold lasts by about a day.      Chiropractic can help with the rib pain and back pain.  Ibuprofen as needed  Heating pad for muscle spasm or cold pack as needed for pain          Follow-ups after your  "visit        Who to contact     If you have questions or need follow up information about today's clinic visit or your schedule please contact [facility] directly at [phone].  Normal or non-critical lab and imaging results will be communicated to you by MyChart, letter or phone within 4 business days after the clinic has received the results. If you do not hear from us within 7 days, please contact the clinic through MyChart or phone. If you have a critical or abnormal lab result, we will notify you by phone as soon as possible.  Submit refill requests through [employer] or call your pharmacy and they will forward the refill request to us. Please allow 3 business days for your refill to be completed.          Additional Information About Your Visit        [facility] Information     [employer] gives you secure access to your electronic health record. If you see a primary care provider, you can also send messages to your care team and make appointments. If you have questions, please call your primary care clinic.  If you do not have a primary care provider, please call [phone] and they will assist you.        Care EveryWhere ID     This is your Care EveryWhere ID. This could be used by other organizations to access your [place] medical records  [id number]        Your Vitals Were     Pulse Temperature Height BMI (Body Mass Index)          95 98  F (36.7  C) (Tympanic) 5' 5\" (1.651 m) 17.91 kg/m2         Blood Pressure from Last 3 Encounters:   02/09/18 116/75   02/02/18 138/89   01/20/18 111/72    Weight from Last 3 Encounters:   02/09/18 107 lb 9.6 oz (48.8 kg)   10/10/17 119 lb (54 kg)   10/04/17 126 lb (57.2 kg)              We Performed the Following     Beta HCG qual [facility] urine - [facility] and [place]        Primary Care Provider Office Phone # Fax #    Larisa Espinal [phone] [address] [address]        Equal Access to Services     RAÚL FUENTES AH: " Yes Hadii sharon delaney Soelizabetali, waaxda luqadaha, qaybta kajesus pappas, morgan shoaibin hayaanoe russellarminda watson lacharlynoe myriam. So [facility] [phone].    ATENCIÓN: Si trinity prince, tiene a cook disposición servicios gratuitos de asistencia lingüística. Llame al [phone].    We comply with applicable federal civil rights laws and [place] laws. We do not discriminate on the basis of race, color, national origin, age, disability, sex, sexual orientation, or gender identity.            Thank you!     Thank you for choosing [facility]  for your care. Our goal is always to provide you with excellent care. Hearing back from our patients is one way we can continue to improve our services. Please take a few minutes to complete the written survey that you may receive in the mail after your visit with us. Thank you!             Your Updated Medication List - Protect others around you: Learn how to safely use, store and throw away your medicines at www.disposemymeds.org.          This list is accurate as of 2/9/18  2:12 PM.  Always use your most recent med list.                   Brand Name Dispense Instructions for use Diagnosis    * albuterol 108 (90 BASE) MCG/ACT Inhaler    PROAIR HFA/PROVENTIL HFA/VENTOLIN HFA    1 Inhaler    Inhale 2 puffs into the lungs every 4 hours as needed for shortness of breath / dyspnea    Mild persistent asthma, uncomplicated       * albuterol (2.5 MG/3ML) 0.083% neb solution     75 mL    Take 1 vial (2.5 mg) by nebulization every 6 hours as needed for shortness of breath / dyspnea or wheezing        medroxyPROGESTERone 150 MG/ML injection    DEPO-PROVERA    3 mL    Inject 1 mL (150 mg) into the muscle every 3 months    Encounter for initial prescription of injectable contraceptive       * Notice:  This list has 2 medication(s) that are the same as other medications prescribed for you. Read the directions carefully, and ask your doctor or other care provider to review them with you.

## 2022-07-09 ENCOUNTER — HOSPITAL ENCOUNTER (EMERGENCY)
Facility: CLINIC | Age: 27
Discharge: LEFT BEFORE DISCHARGE | End: 2022-07-10
Payer: COMMERCIAL

## 2022-07-10 ENCOUNTER — HOSPITAL ENCOUNTER (EMERGENCY)
Facility: CLINIC | Age: 27
Discharge: HOME | End: 2022-07-10
Attending: EMERGENCY MEDICINE | Admitting: EMERGENCY MEDICINE
Payer: COMMERCIAL

## 2022-07-10 VITALS
SYSTOLIC BLOOD PRESSURE: 110 MMHG | HEIGHT: 65 IN | RESPIRATION RATE: 16 BRPM | DIASTOLIC BLOOD PRESSURE: 86 MMHG | OXYGEN SATURATION: 95 % | TEMPERATURE: 98.2 F | HEART RATE: 103 BPM | WEIGHT: 119.93 LBS | BODY MASS INDEX: 19.98 KG/M2

## 2022-07-10 DIAGNOSIS — M79.642 PAIN OF LEFT HAND: ICD-10-CM

## 2022-07-10 PROCEDURE — 99283 EMERGENCY DEPT VISIT LOW MDM: CPT | Performed by: EMERGENCY MEDICINE

## 2022-07-10 PROCEDURE — 29125 APPL SHORT ARM SPLINT STATIC: CPT | Mod: LT | Performed by: EMERGENCY MEDICINE

## 2022-07-10 PROCEDURE — 99284 EMERGENCY DEPT VISIT MOD MDM: CPT | Performed by: EMERGENCY MEDICINE

## 2022-07-10 ASSESSMENT — ENCOUNTER SYMPTOMS
CHEST TIGHTNESS: 0
COLOR CHANGE: 0
NUMBNESS: 1
HEADACHES: 0
COUGH: 0
APPETITE CHANGE: 0
ABDOMINAL PAIN: 0
SHORTNESS OF BREATH: 0
CONFUSION: 0
NERVOUS/ANXIOUS: 0
FATIGUE: 0
FEVER: 0
CHILLS: 0
LIGHT-HEADEDNESS: 0
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
## 2022-07-10 NOTE — ED TRIAGE NOTES
"Pt work up yesterday AM. Had \"worse pain ever, worse than labor pains\", in both arms, sharp shooting pain.  Pt was crying.   Pt has had a pinched nerve in neck in past with tingling in fingers and arms.  Has gone to [facility] in the past.       Different positions relieve the pain some, other movements make it worse.   Pt has not taken any medication for the pain.  Pt rates pain as a 6/10 currently, but then states fingers are numb.        "

## 2022-07-10 NOTE — Clinical Note
Daniela Byrne was seen and treated in our emergency department on 7/10/2022.  She may return to work on 07/11/2022.  Daniela should use a wrist splint for at least the next 1-2 weeks to rest her injured wrist. Ideally she should limit lifting and repetitive actions with the wrist until healed.      If you have any questions or concerns, please don't hesitate to call.      Chinmay Flowers MD

## 2022-07-10 NOTE — ED NOTES
Pt resting and playing on cell phone with both hands, appears to be in no acute distress.   Pt boyfriend at bedside.

## 2022-07-11 ENCOUNTER — PATIENT OUTREACH (OUTPATIENT)
Dept: FAMILY MEDICINE | Facility: CLINIC | Age: 27
End: 2022-07-11

## 2022-11-19 ENCOUNTER — HEALTH MAINTENANCE LETTER (OUTPATIENT)
Age: 27
End: 2022-11-19

## 2023-04-04 ENCOUNTER — HOSPITAL ENCOUNTER (EMERGENCY)
Facility: CLINIC | Age: 28
Discharge: HOME | End: 2023-04-04
Attending: NURSE PRACTITIONER | Admitting: NURSE PRACTITIONER
Payer: COMMERCIAL

## 2023-04-04 VITALS
HEART RATE: 91 BPM | OXYGEN SATURATION: 98 % | BODY MASS INDEX: 21.13 KG/M2 | SYSTOLIC BLOOD PRESSURE: 123 MMHG | RESPIRATION RATE: 16 BRPM | DIASTOLIC BLOOD PRESSURE: 80 MMHG | TEMPERATURE: 98.1 F | WEIGHT: 127 LBS

## 2023-04-04 DIAGNOSIS — Z32.02 PREGNANCY EXAMINATION OR TEST, NEGATIVE RESULT: ICD-10-CM

## 2023-04-04 LAB — HCG SERPL QL: NEGATIVE

## 2023-04-04 PROCEDURE — 99283 EMERGENCY DEPT VISIT LOW MDM: CPT | Performed by: NURSE PRACTITIONER

## 2023-04-04 PROCEDURE — 84703 CHORIONIC GONADOTROPIN ASSAY: CPT | Performed by: NURSE PRACTITIONER

## 2023-04-04 PROCEDURE — 36415 COLL VENOUS BLD VENIPUNCTURE: CPT | Performed by: FAMILY MEDICINE

## 2023-04-04 ASSESSMENT — ACTIVITIES OF DAILY LIVING (ADL): ADLS_ACUITY_SCORE: 33

## 2023-04-04 NOTE — ED NOTES
".   [facility].   Pt requesting pregnancy test because \" I think I am about 2 months pregnant\"   LMP was in October.  Pt reports heartburn and \"feeling stomach move around\" getting better.  Pt reports light vaginal bleeding.   "

## 2023-04-04 NOTE — ED TRIAGE NOTES
Pt here with pregnancy symptoms, neg test at home. Daily drinking, didn't know she was pregnant until 30 weeks.     Triage Assessment     Row Name 04/04/23 1412       Triage Assessment (Adult)    Airway WDL WDL       Respiratory WDL    Respiratory WDL WDL       Skin Circulation/Temperature WDL    Skin Circulation/Temperature WDL WDL       Cardiac WDL    Cardiac WDL WDL       Peripheral/Neurovascular WDL    Peripheral Neurovascular WDL WDL       Cognitive/Neuro/Behavioral WDL    Cognitive/Neuro/Behavioral WDL WDL

## 2023-04-05 ENCOUNTER — PATIENT OUTREACH (OUTPATIENT)
Dept: FAMILY MEDICINE | Facility: CLINIC | Age: 28
End: 2023-04-05

## 2023-04-05 NOTE — TELEPHONE ENCOUNTER
"  ED for acute condition Discharge Protocol    \"Hi, my name is Krupa Arrington RN, a registered nurse, and I am calling from [facility].  I am calling to follow up and see how things are going for you after your recent emergency visit.\"    Tell me how you are doing now that you are home?\" I am just fine. Wondering if STD's were tested with my blood.  This is explained to her that most are vaginal tested and blood STD's are HIV and Syphilis      Discharge Instructions    \"Let's review your discharge instructions.  What is/are the follow-up recommendations?  Pt. Response: nope    \"Has an appointment with your primary care provider been scheduled?\"  No (not needed)    Medications    \"Tell me what changed about your medicines when you discharged?\"    no    \"What questions do you have about your medications?\"   None        Call Summary    \"What questions or concerns do you have about your recent visit and your follow-up care?\"     none    \"If you have questions or things don't continue to improve, we encourage you contact us through the main clinic number (give number).  Even if the clinic is not open, triage nurses are available 24/7 to help you.     We would like you to know that our clinic has extended hours (provide information).  We also have urgent care (provide details on closest location and hours/contact info)\"    \"Thank you for your time and take care!\"                "

## 2023-04-05 NOTE — TELEPHONE ENCOUNTER
ED / Discharge Outreach Protocol    Patient Contact    Attempt # 1    Was call answered?  No.  Left message on voicemail with information to call clinic.    Cristela Matthews RN
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
## 2023-04-09 ENCOUNTER — HEALTH MAINTENANCE LETTER (OUTPATIENT)
Age: 28
End: 2023-04-09

## 2023-07-30 ENCOUNTER — OFFICE VISIT (OUTPATIENT)
Dept: FAMILY MEDICINE | Facility: CLINIC | Age: 28
End: 2023-07-30
Payer: COMMERCIAL

## 2023-07-30 VITALS
DIASTOLIC BLOOD PRESSURE: 88 MMHG | OXYGEN SATURATION: 98 % | SYSTOLIC BLOOD PRESSURE: 137 MMHG | TEMPERATURE: 97.8 F | HEART RATE: 91 BPM | RESPIRATION RATE: 22 BRPM

## 2023-07-30 DIAGNOSIS — N39.0 URINARY TRACT INFECTION WITHOUT HEMATURIA, SITE UNSPECIFIED: Primary | ICD-10-CM

## 2023-07-30 DIAGNOSIS — N89.8 VAGINAL DISCHARGE: ICD-10-CM

## 2023-07-30 DIAGNOSIS — J45.30 MILD PERSISTENT ASTHMA, UNCOMPLICATED: ICD-10-CM

## 2023-07-30 DIAGNOSIS — Z11.3 SCREEN FOR STD (SEXUALLY TRANSMITTED DISEASE): ICD-10-CM

## 2023-07-30 LAB
ALBUMIN UR-MCNC: 30 MG/DL
APPEARANCE UR: ABNORMAL
BACTERIA #/AREA URNS HPF: ABNORMAL /HPF
BILIRUB UR QL STRIP: NEGATIVE
CLUE CELLS: ABNORMAL
COLOR UR AUTO: YELLOW
GLUCOSE UR STRIP-MCNC: NEGATIVE MG/DL
HGB UR QL STRIP: NEGATIVE
KETONES UR STRIP-MCNC: ABNORMAL MG/DL
LEUKOCYTE ESTERASE UR QL STRIP: NEGATIVE
MUCOUS THREADS #/AREA URNS LPF: PRESENT /LPF
NITRATE UR QL: POSITIVE
PH UR STRIP: 6 [PH] (ref 5–8)
RBC #/AREA URNS AUTO: ABNORMAL /HPF
SP GR UR STRIP: >=1.03 (ref 1–1.03)
SQUAMOUS #/AREA URNS AUTO: ABNORMAL /LPF
TRICHOMONAS, WET PREP: ABNORMAL
UROBILINOGEN UR STRIP-ACNC: 0.2 E.U./DL
WBC #/AREA URNS AUTO: ABNORMAL /HPF
WBC'S/HIGH POWER FIELD, WET PREP: ABNORMAL
YEAST, WET PREP: ABNORMAL

## 2023-07-30 PROCEDURE — 86780 TREPONEMA PALLIDUM: CPT | Performed by: FAMILY MEDICINE

## 2023-07-30 PROCEDURE — 87389 HIV-1 AG W/HIV-1&-2 AB AG IA: CPT | Performed by: FAMILY MEDICINE

## 2023-07-30 PROCEDURE — 81001 URINALYSIS AUTO W/SCOPE: CPT | Performed by: FAMILY MEDICINE

## 2023-07-30 PROCEDURE — 99204 OFFICE O/P NEW MOD 45 MIN: CPT | Performed by: FAMILY MEDICINE

## 2023-07-30 PROCEDURE — 87491 CHLMYD TRACH DNA AMP PROBE: CPT | Performed by: FAMILY MEDICINE

## 2023-07-30 PROCEDURE — 36415 COLL VENOUS BLD VENIPUNCTURE: CPT | Performed by: FAMILY MEDICINE

## 2023-07-30 PROCEDURE — 87591 N.GONORRHOEAE DNA AMP PROB: CPT | Performed by: FAMILY MEDICINE

## 2023-07-30 PROCEDURE — 87186 SC STD MICRODIL/AGAR DIL: CPT | Performed by: FAMILY MEDICINE

## 2023-07-30 PROCEDURE — 87210 SMEAR WET MOUNT SALINE/INK: CPT | Performed by: FAMILY MEDICINE

## 2023-07-30 PROCEDURE — 86803 HEPATITIS C AB TEST: CPT | Performed by: FAMILY MEDICINE

## 2023-07-30 PROCEDURE — 87086 URINE CULTURE/COLONY COUNT: CPT | Performed by: FAMILY MEDICINE

## 2023-07-30 RX ORDER — CEPHALEXIN 500 MG/1
500 CAPSULE ORAL 2 TIMES DAILY
Qty: 20 CAPSULE | Refills: 0 | Status: SHIPPED | OUTPATIENT
Start: 2023-07-30 | End: 2023-07-30

## 2023-07-30 RX ORDER — ALBUTEROL SULFATE 90 UG/1
2 AEROSOL, METERED RESPIRATORY (INHALATION) EVERY 4 HOURS PRN
Qty: 18 G | Refills: 0 | Status: SHIPPED | OUTPATIENT
Start: 2023-07-30

## 2023-07-30 RX ORDER — CEPHALEXIN 500 MG/1
500 CAPSULE ORAL 2 TIMES DAILY
Qty: 20 CAPSULE | Refills: 0 | Status: SHIPPED | OUTPATIENT
Start: 2023-07-30
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
## 2023-07-30 NOTE — PATIENT INSTRUCTIONS
Start cephalexin 2 times a day for 10 days always take with food to prevent nausea vomiting     If nausea/vomiting,abdominal pain, fever to ER      Follow up if your symptoms worsen in any way.     Follow up with your primary care provider or clinic in about 2-3 days  if your symptoms do not improve

## 2023-07-31 LAB
BACTERIA UR CULT: ABNORMAL
C TRACH DNA SPEC QL PROBE+SIG AMP: NEGATIVE
HCV AB SERPL QL IA: NONREACTIVE
HIV 1+2 AB+HIV1 P24 AG SERPL QL IA: NONREACTIVE
N GONORRHOEA DNA SPEC QL NAA+PROBE: NEGATIVE
T PALLIDUM AB SER QL: NONREACTIVE

## 2023-08-17 DIAGNOSIS — Z86.19 H/O COLD SORES: Primary | ICD-10-CM

## 2023-08-17 NOTE — TELEPHONE ENCOUNTER
Routing refill request to provider for review/approval because:  A break in medication    Thank you    Francisca CHAUDHARI RN

## 2023-08-17 NOTE — TELEPHONE ENCOUNTER
Pending Prescriptions:                       Disp   Refills    valACYclovir (VALTREX) 500 MG tablet      6 tabl*3            Sig: Take 1 tablet (500 mg) by mouth 2 times daily

## 2023-08-24 RX ORDER — VALACYCLOVIR HYDROCHLORIDE 500 MG/1
500 TABLET, FILM COATED ORAL 2 TIMES DAILY
Qty: 6 TABLET | Refills: 3 | Status: SHIPPED | OUTPATIENT
Start: 2023-08-24

## 2024-01-31 ENCOUNTER — HOSPITAL ENCOUNTER (EMERGENCY)
Facility: CLINIC | Age: 29
Discharge: HOME | End: 2024-01-31
Attending: EMERGENCY MEDICINE | Admitting: EMERGENCY MEDICINE

## 2024-01-31 VITALS
SYSTOLIC BLOOD PRESSURE: 124 MMHG | OXYGEN SATURATION: 99 % | DIASTOLIC BLOOD PRESSURE: 81 MMHG | TEMPERATURE: 97 F | HEART RATE: 97 BPM | WEIGHT: 143 LBS | RESPIRATION RATE: 18 BRPM | BODY MASS INDEX: 23.8 KG/M2

## 2024-01-31 DIAGNOSIS — R10.84 GENERALIZED ABDOMINAL PAIN: ICD-10-CM

## 2024-01-31 PROCEDURE — 99283 EMERGENCY DEPT VISIT LOW MDM: CPT | Performed by: EMERGENCY MEDICINE

## 2024-01-31 PROCEDURE — 99282 EMERGENCY DEPT VISIT SF MDM: CPT | Performed by: EMERGENCY MEDICINE

## 2024-01-31 NOTE — ED NOTES
Pt brought stickers into pt's room per pt request and advised writer would be back with discharge paperwork.   Pt walked out of ED before receiving discharge paperwork.
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
## 2024-01-31 NOTE — DISCHARGE INSTRUCTIONS
Follow-up in clinic as needed.    Your pregnancy test was negative yesterday.  Bedside ultrasound showed no signs of pregnancy today.

## 2024-01-31 NOTE — ED TRIAGE NOTES
Here from home with LUQ that radiates to RLQ quadrant, intermittent crampy in nature-present for many months, denies fever/chills, denies n/v/d. States she has had irregular periods since August & had negative UPTs with her last pregnancy, didn't know she was pregnant till last term.     Triage Assessment (Adult)       Row Name 01/31/24 1313          Triage Assessment    Airway WDL WDL        Respiratory WDL    Respiratory WDL WDL        Skin Circulation/Temperature WDL    Skin Circulation/Temperature WDL WDL        Cardiac WDL    Cardiac WDL WDL        Peripheral/Neurovascular WDL    Peripheral Neurovascular WDL WDL        Cognitive/Neuro/Behavioral WDL    Cognitive/Neuro/Behavioral WDL WDL

## 2024-03-27 ENCOUNTER — HOSPITAL ENCOUNTER (EMERGENCY)
Facility: CLINIC | Age: 29
Discharge: HOME | End: 2024-03-27
Attending: FAMILY MEDICINE | Admitting: FAMILY MEDICINE
Payer: MEDICAID

## 2024-03-27 ENCOUNTER — APPOINTMENT (OUTPATIENT)
Dept: GENERAL RADIOLOGY | Facility: CLINIC | Age: 29
End: 2024-03-27
Attending: FAMILY MEDICINE
Payer: MEDICAID

## 2024-03-27 VITALS
SYSTOLIC BLOOD PRESSURE: 124 MMHG | OXYGEN SATURATION: 98 % | TEMPERATURE: 96.8 F | HEART RATE: 69 BPM | RESPIRATION RATE: 24 BRPM | DIASTOLIC BLOOD PRESSURE: 70 MMHG

## 2024-03-27 DIAGNOSIS — S16.1XXA CERVICAL STRAIN, INITIAL ENCOUNTER: ICD-10-CM

## 2024-03-27 DIAGNOSIS — S49.92XA ACROMIOCLAVICULAR (AC) JOINT INJURY, LEFT, INITIAL ENCOUNTER: ICD-10-CM

## 2024-03-27 LAB — HCG UR QL: NEGATIVE

## 2024-03-27 PROCEDURE — 72040 X-RAY EXAM NECK SPINE 2-3 VW: CPT

## 2024-03-27 PROCEDURE — 99284 EMERGENCY DEPT VISIT MOD MDM: CPT | Performed by: FAMILY MEDICINE

## 2024-03-27 PROCEDURE — 250N000013 HC RX MED GY IP 250 OP 250 PS 637: Performed by: FAMILY MEDICINE

## 2024-03-27 PROCEDURE — 81025 URINE PREGNANCY TEST: CPT | Performed by: FAMILY MEDICINE

## 2024-03-27 PROCEDURE — 99283 EMERGENCY DEPT VISIT LOW MDM: CPT | Performed by: FAMILY MEDICINE

## 2024-03-27 PROCEDURE — 73000 X-RAY EXAM OF COLLAR BONE: CPT | Mod: LT

## 2024-03-27 RX ORDER — ACETAMINOPHEN 500 MG
1000 TABLET ORAL ONCE
Status: COMPLETED | OUTPATIENT
Start: 2024-03-27 | End: 2024-03-27

## 2024-03-27 RX ADMIN — ACETAMINOPHEN 1000 MG: 500 TABLET, FILM COATED ORAL at 17:28

## 2024-03-27 ASSESSMENT — COLUMBIA-SUICIDE SEVERITY RATING SCALE - C-SSRS
6. HAVE YOU EVER DONE ANYTHING, STARTED TO DO ANYTHING, OR PREPARED TO DO ANYTHING TO END YOUR LIFE?: NO
2. HAVE YOU ACTUALLY HAD ANY THOUGHTS OF KILLING YOURSELF IN THE PAST MONTH?: NO
1. IN THE PAST MONTH, HAVE YOU WISHED YOU WERE DEAD OR WISHED YOU COULD GO TO SLEEP AND NOT WAKE UP?: NO

## 2024-03-27 ASSESSMENT — ACTIVITIES OF DAILY LIVING (ADL)
ADLS_ACUITY_SCORE: 33
ADLS_ACUITY_SCORE: 33
ADLS_ACUITY_SCORE: 35
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
## 2024-03-27 NOTE — DISCHARGE INSTRUCTIONS
RETURN TO THE EMERGENCY ROOM FOR THE FOLLOWING:    Severely worsened pain, or at anytime for any concern.    FOLLOW UP:    With your primary clinic in 2 weeks if not improving    TREATMENT RECOMMENDATIONS:    Ibuprofen 600 mg every six hours for pain (7 days duration).  Tylenol 1000 mg every six hours for pain (7 days duration).  Therefore, you can alternate these every three hours and do it safely.  ONLY take these medications if it is safe to do so given your medical history.    NURSE ADVICE LINE:  [phone] or [phone]
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
## 2024-06-16 ENCOUNTER — HEALTH MAINTENANCE LETTER (OUTPATIENT)
Age: 29
End: 2024-06-16